# Patient Record
Sex: FEMALE | Race: WHITE | NOT HISPANIC OR LATINO | Employment: UNEMPLOYED | URBAN - METROPOLITAN AREA
[De-identification: names, ages, dates, MRNs, and addresses within clinical notes are randomized per-mention and may not be internally consistent; named-entity substitution may affect disease eponyms.]

---
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
## 2022-01-21 LAB — GROUP B STREPTOCOCCUS (EXTERNAL): NEGATIVE

## 2022-02-11 ENCOUNTER — DOCUMENTATION ONLY (OUTPATIENT)
Dept: OTHER | Facility: CLINIC | Age: 22
End: 2022-02-11
Payer: COMMERCIAL

## 2022-02-11 ENCOUNTER — ANESTHESIA EVENT (OUTPATIENT)
Dept: OBGYN | Facility: HOSPITAL | Age: 22
End: 2022-02-11
Payer: COMMERCIAL

## 2022-02-11 ENCOUNTER — HOSPITAL ENCOUNTER (INPATIENT)
Facility: HOSPITAL | Age: 22
LOS: 2 days | Discharge: HOME | End: 2022-02-13
Attending: ADVANCED PRACTICE MIDWIFE | Admitting: ADVANCED PRACTICE MIDWIFE
Payer: COMMERCIAL

## 2022-02-11 ENCOUNTER — ANESTHESIA (OUTPATIENT)
Dept: OBGYN | Facility: HOSPITAL | Age: 22
End: 2022-02-11
Payer: COMMERCIAL

## 2022-02-11 PROBLEM — Z37.9 NORMAL LABOR: Status: ACTIVE | Noted: 2022-02-11

## 2022-02-11 PROBLEM — Z36.89 ENCOUNTER FOR TRIAGE IN PREGNANT PATIENT: Status: ACTIVE | Noted: 2022-02-11

## 2022-02-11 LAB
ABO/RH(D): NORMAL
ANTIBODY SCREEN: NEGATIVE
BASOPHILS # BLD AUTO: 0.1 10E3/UL (ref 0–0.2)
BASOPHILS NFR BLD AUTO: 0 %
EOSINOPHIL # BLD AUTO: 0.1 10E3/UL (ref 0–0.7)
EOSINOPHIL NFR BLD AUTO: 0 %
ERYTHROCYTE [DISTWIDTH] IN BLOOD BY AUTOMATED COUNT: 16.1 % (ref 10–15)
HCT VFR BLD AUTO: 35.5 % (ref 35–47)
HGB BLD-MCNC: 12.1 G/DL (ref 11.7–15.7)
IMM GRANULOCYTES # BLD: 0.2 10E3/UL
IMM GRANULOCYTES NFR BLD: 1 %
LYMPHOCYTES # BLD AUTO: 1.5 10E3/UL (ref 0.8–5.3)
LYMPHOCYTES NFR BLD AUTO: 6 %
MCH RBC QN AUTO: 29.3 PG (ref 26.5–33)
MCHC RBC AUTO-ENTMCNC: 34.1 G/DL (ref 31.5–36.5)
MCV RBC AUTO: 86 FL (ref 78–100)
MONOCYTES # BLD AUTO: 0.9 10E3/UL (ref 0–1.3)
MONOCYTES NFR BLD AUTO: 4 %
NEUTROPHILS # BLD AUTO: 21.8 10E3/UL (ref 1.6–8.3)
NEUTROPHILS NFR BLD AUTO: 89 %
NRBC # BLD AUTO: 0 10E3/UL
NRBC BLD AUTO-RTO: 0 /100
PLATELET # BLD AUTO: 238 10E3/UL (ref 150–450)
RBC # BLD AUTO: 4.13 10E6/UL (ref 3.8–5.2)
RUPTURE OF FETAL MEMBRANES BY ROM PLUS: POSITIVE
SARS-COV-2 RNA RESP QL NAA+PROBE: NEGATIVE
SPECIMEN EXPIRATION DATE: NORMAL
WBC # BLD AUTO: 24.3 10E3/UL (ref 4–11)

## 2022-02-11 PROCEDURE — 86780 TREPONEMA PALLIDUM: CPT | Performed by: ADVANCED PRACTICE MIDWIFE

## 2022-02-11 PROCEDURE — 3E0R3BZ INTRODUCTION OF ANESTHETIC AGENT INTO SPINAL CANAL, PERCUTANEOUS APPROACH: ICD-10-PCS | Performed by: ANESTHESIOLOGY

## 2022-02-11 PROCEDURE — 370N000003 HC ANESTHESIA WARD SERVICE

## 2022-02-11 PROCEDURE — 250N000009 HC RX 250: Performed by: ADVANCED PRACTICE MIDWIFE

## 2022-02-11 PROCEDURE — 0KQM0ZZ REPAIR PERINEUM MUSCLE, OPEN APPROACH: ICD-10-PCS | Performed by: ADVANCED PRACTICE MIDWIFE

## 2022-02-11 PROCEDURE — 250N000011 HC RX IP 250 OP 636: Performed by: ADVANCED PRACTICE MIDWIFE

## 2022-02-11 PROCEDURE — 85025 COMPLETE CBC W/AUTO DIFF WBC: CPT | Performed by: ADVANCED PRACTICE MIDWIFE

## 2022-02-11 PROCEDURE — 258N000003 HC RX IP 258 OP 636: Performed by: ADVANCED PRACTICE MIDWIFE

## 2022-02-11 PROCEDURE — 120N000001 HC R&B MED SURG/OB

## 2022-02-11 PROCEDURE — 86901 BLOOD TYPING SEROLOGIC RH(D): CPT | Performed by: ADVANCED PRACTICE MIDWIFE

## 2022-02-11 PROCEDURE — 84112 EVAL AMNIOTIC FLUID PROTEIN: CPT | Performed by: ADVANCED PRACTICE MIDWIFE

## 2022-02-11 PROCEDURE — 250N000013 HC RX MED GY IP 250 OP 250 PS 637: Performed by: ADVANCED PRACTICE MIDWIFE

## 2022-02-11 PROCEDURE — 722N000001 HC LABOR CARE VAGINAL DELIVERY SINGLE

## 2022-02-11 PROCEDURE — 258N000003 HC RX IP 258 OP 636: Performed by: ANESTHESIOLOGY

## 2022-02-11 PROCEDURE — 250N000011 HC RX IP 250 OP 636: Performed by: ANESTHESIOLOGY

## 2022-02-11 PROCEDURE — 00HU33Z INSERTION OF INFUSION DEVICE INTO SPINAL CANAL, PERCUTANEOUS APPROACH: ICD-10-PCS | Performed by: ANESTHESIOLOGY

## 2022-02-11 PROCEDURE — 87635 SARS-COV-2 COVID-19 AMP PRB: CPT | Performed by: ADVANCED PRACTICE MIDWIFE

## 2022-02-11 PROCEDURE — 36415 COLL VENOUS BLD VENIPUNCTURE: CPT | Performed by: ADVANCED PRACTICE MIDWIFE

## 2022-02-11 PROCEDURE — 250N000009 HC RX 250: Performed by: ANESTHESIOLOGY

## 2022-02-11 RX ORDER — FENTANYL CITRATE-0.9 % NACL/PF 10 MCG/ML
100 PLASTIC BAG, INJECTION (ML) INTRAVENOUS EVERY 5 MIN PRN
Status: DISCONTINUED | OUTPATIENT
Start: 2022-02-11 | End: 2022-02-11 | Stop reason: HOSPADM

## 2022-02-11 RX ORDER — METHYLERGONOVINE MALEATE 0.2 MG/ML
200 INJECTION INTRAVENOUS
Status: DISCONTINUED | OUTPATIENT
Start: 2022-02-11 | End: 2022-02-11 | Stop reason: HOSPADM

## 2022-02-11 RX ORDER — METHYLERGONOVINE MALEATE 0.2 MG/ML
200 INJECTION INTRAVENOUS
Status: DISCONTINUED | OUTPATIENT
Start: 2022-02-11 | End: 2022-02-13 | Stop reason: HOSPADM

## 2022-02-11 RX ORDER — MISOPROSTOL 200 UG/1
400 TABLET ORAL
Status: DISCONTINUED | OUTPATIENT
Start: 2022-02-11 | End: 2022-02-13 | Stop reason: HOSPADM

## 2022-02-11 RX ORDER — OXYTOCIN/0.9 % SODIUM CHLORIDE 30/500 ML
1-24 PLASTIC BAG, INJECTION (ML) INTRAVENOUS CONTINUOUS
Status: DISCONTINUED | OUTPATIENT
Start: 2022-02-11 | End: 2022-02-11 | Stop reason: HOSPADM

## 2022-02-11 RX ORDER — OXYTOCIN/0.9 % SODIUM CHLORIDE 30/500 ML
340 PLASTIC BAG, INJECTION (ML) INTRAVENOUS CONTINUOUS PRN
Status: DISCONTINUED | OUTPATIENT
Start: 2022-02-11 | End: 2022-02-11 | Stop reason: HOSPADM

## 2022-02-11 RX ORDER — OXYTOCIN 10 [USP'U]/ML
10 INJECTION, SOLUTION INTRAMUSCULAR; INTRAVENOUS
Status: DISCONTINUED | OUTPATIENT
Start: 2022-02-11 | End: 2022-02-13 | Stop reason: HOSPADM

## 2022-02-11 RX ORDER — FERROUS SULFATE 325(65) MG
325 TABLET ORAL
COMMUNITY
End: 2022-12-26

## 2022-02-11 RX ORDER — IBUPROFEN 600 MG/1
600 TABLET, FILM COATED ORAL
Status: DISCONTINUED | OUTPATIENT
Start: 2022-02-11 | End: 2022-02-13 | Stop reason: HOSPADM

## 2022-02-11 RX ORDER — IBUPROFEN 800 MG/1
800 TABLET, FILM COATED ORAL EVERY 6 HOURS PRN
Status: DISCONTINUED | OUTPATIENT
Start: 2022-02-11 | End: 2022-02-13 | Stop reason: HOSPADM

## 2022-02-11 RX ORDER — OXYTOCIN 10 [USP'U]/ML
10 INJECTION, SOLUTION INTRAMUSCULAR; INTRAVENOUS
Status: DISCONTINUED | OUTPATIENT
Start: 2022-02-11 | End: 2022-02-11 | Stop reason: HOSPADM

## 2022-02-11 RX ORDER — OXYCODONE HYDROCHLORIDE 5 MG/1
5 TABLET ORAL
Status: DISCONTINUED | OUTPATIENT
Start: 2022-02-11 | End: 2022-02-13 | Stop reason: HOSPADM

## 2022-02-11 RX ORDER — MISOPROSTOL 200 UG/1
800 TABLET ORAL
Status: DISCONTINUED | OUTPATIENT
Start: 2022-02-11 | End: 2022-02-11 | Stop reason: HOSPADM

## 2022-02-11 RX ORDER — NALOXONE HYDROCHLORIDE 0.4 MG/ML
0.4 INJECTION, SOLUTION INTRAMUSCULAR; INTRAVENOUS; SUBCUTANEOUS
Status: DISCONTINUED | OUTPATIENT
Start: 2022-02-11 | End: 2022-02-13 | Stop reason: HOSPADM

## 2022-02-11 RX ORDER — ONDANSETRON 4 MG/1
4 TABLET, ORALLY DISINTEGRATING ORAL EVERY 6 HOURS PRN
Status: DISCONTINUED | OUTPATIENT
Start: 2022-02-11 | End: 2022-02-11 | Stop reason: HOSPADM

## 2022-02-11 RX ORDER — OXYTOCIN/0.9 % SODIUM CHLORIDE 30/500 ML
100-340 PLASTIC BAG, INJECTION (ML) INTRAVENOUS CONTINUOUS PRN
Status: DISCONTINUED | OUTPATIENT
Start: 2022-02-11 | End: 2022-02-13 | Stop reason: HOSPADM

## 2022-02-11 RX ORDER — MISOPROSTOL 200 UG/1
800 TABLET ORAL
Status: DISCONTINUED | OUTPATIENT
Start: 2022-02-11 | End: 2022-02-13 | Stop reason: HOSPADM

## 2022-02-11 RX ORDER — CARBOPROST TROMETHAMINE 250 UG/ML
250 INJECTION, SOLUTION INTRAMUSCULAR
Status: DISCONTINUED | OUTPATIENT
Start: 2022-02-11 | End: 2022-02-13 | Stop reason: HOSPADM

## 2022-02-11 RX ORDER — METOCLOPRAMIDE HYDROCHLORIDE 5 MG/ML
10 INJECTION INTRAMUSCULAR; INTRAVENOUS EVERY 6 HOURS PRN
Status: DISCONTINUED | OUTPATIENT
Start: 2022-02-11 | End: 2022-02-11 | Stop reason: HOSPADM

## 2022-02-11 RX ORDER — LIDOCAINE 40 MG/G
CREAM TOPICAL
Status: DISCONTINUED | OUTPATIENT
Start: 2022-02-11 | End: 2022-02-11 | Stop reason: HOSPADM

## 2022-02-11 RX ORDER — KETOROLAC TROMETHAMINE 30 MG/ML
30 INJECTION, SOLUTION INTRAMUSCULAR; INTRAVENOUS
Status: DISCONTINUED | OUTPATIENT
Start: 2022-02-11 | End: 2022-02-13 | Stop reason: HOSPADM

## 2022-02-11 RX ORDER — PRENATAL VIT/IRON FUM/FOLIC AC 27MG-0.8MG
1 TABLET ORAL DAILY
COMMUNITY
End: 2022-12-26

## 2022-02-11 RX ORDER — NALOXONE HYDROCHLORIDE 0.4 MG/ML
0.2 INJECTION, SOLUTION INTRAMUSCULAR; INTRAVENOUS; SUBCUTANEOUS
Status: DISCONTINUED | OUTPATIENT
Start: 2022-02-11 | End: 2022-02-11 | Stop reason: HOSPADM

## 2022-02-11 RX ORDER — FENTANYL CITRATE 50 UG/ML
50-100 INJECTION, SOLUTION INTRAMUSCULAR; INTRAVENOUS
Status: DISCONTINUED | OUTPATIENT
Start: 2022-02-11 | End: 2022-02-11 | Stop reason: HOSPADM

## 2022-02-11 RX ORDER — ACETAMINOPHEN 325 MG/1
650 TABLET ORAL EVERY 4 HOURS PRN
Status: DISCONTINUED | OUTPATIENT
Start: 2022-02-11 | End: 2022-02-11 | Stop reason: HOSPADM

## 2022-02-11 RX ORDER — PROCHLORPERAZINE MALEATE 10 MG
10 TABLET ORAL EVERY 6 HOURS PRN
Status: DISCONTINUED | OUTPATIENT
Start: 2022-02-11 | End: 2022-02-11 | Stop reason: HOSPADM

## 2022-02-11 RX ORDER — OXYTOCIN/0.9 % SODIUM CHLORIDE 30/500 ML
340 PLASTIC BAG, INJECTION (ML) INTRAVENOUS CONTINUOUS PRN
Status: DISCONTINUED | OUTPATIENT
Start: 2022-02-11 | End: 2022-02-13 | Stop reason: HOSPADM

## 2022-02-11 RX ORDER — ACETAMINOPHEN 325 MG/1
650 TABLET ORAL EVERY 4 HOURS PRN
Status: DISCONTINUED | OUTPATIENT
Start: 2022-02-11 | End: 2022-02-13 | Stop reason: HOSPADM

## 2022-02-11 RX ORDER — ONDANSETRON 2 MG/ML
4 INJECTION INTRAMUSCULAR; INTRAVENOUS EVERY 6 HOURS PRN
Status: DISCONTINUED | OUTPATIENT
Start: 2022-02-11 | End: 2022-02-11 | Stop reason: HOSPADM

## 2022-02-11 RX ORDER — NALBUPHINE HYDROCHLORIDE 10 MG/ML
2.5-5 INJECTION, SOLUTION INTRAMUSCULAR; INTRAVENOUS; SUBCUTANEOUS EVERY 6 HOURS PRN
Status: DISCONTINUED | OUTPATIENT
Start: 2022-02-11 | End: 2022-02-13 | Stop reason: HOSPADM

## 2022-02-11 RX ORDER — HYDROXYZINE HYDROCHLORIDE 50 MG/1
50 TABLET, FILM COATED ORAL ONCE
Status: COMPLETED | OUTPATIENT
Start: 2022-02-11 | End: 2022-02-11

## 2022-02-11 RX ORDER — SODIUM CHLORIDE, SODIUM LACTATE, POTASSIUM CHLORIDE, CALCIUM CHLORIDE 600; 310; 30; 20 MG/100ML; MG/100ML; MG/100ML; MG/100ML
INJECTION, SOLUTION INTRAVENOUS CONTINUOUS
Status: DISCONTINUED | OUTPATIENT
Start: 2022-02-11 | End: 2022-02-11 | Stop reason: HOSPADM

## 2022-02-11 RX ORDER — NALOXONE HYDROCHLORIDE 0.4 MG/ML
0.4 INJECTION, SOLUTION INTRAMUSCULAR; INTRAVENOUS; SUBCUTANEOUS
Status: DISCONTINUED | OUTPATIENT
Start: 2022-02-11 | End: 2022-02-11 | Stop reason: HOSPADM

## 2022-02-11 RX ORDER — PROCHLORPERAZINE 25 MG
25 SUPPOSITORY, RECTAL RECTAL EVERY 12 HOURS PRN
Status: DISCONTINUED | OUTPATIENT
Start: 2022-02-11 | End: 2022-02-11 | Stop reason: HOSPADM

## 2022-02-11 RX ORDER — MISOPROSTOL 200 UG/1
400 TABLET ORAL
Status: DISCONTINUED | OUTPATIENT
Start: 2022-02-11 | End: 2022-02-11 | Stop reason: HOSPADM

## 2022-02-11 RX ORDER — SODIUM CHLORIDE, SODIUM LACTATE, POTASSIUM CHLORIDE, CALCIUM CHLORIDE 600; 310; 30; 20 MG/100ML; MG/100ML; MG/100ML; MG/100ML
INJECTION, SOLUTION INTRAVENOUS CONTINUOUS PRN
Status: DISCONTINUED | OUTPATIENT
Start: 2022-02-11 | End: 2022-02-11 | Stop reason: HOSPADM

## 2022-02-11 RX ORDER — NALOXONE HYDROCHLORIDE 0.4 MG/ML
0.2 INJECTION, SOLUTION INTRAMUSCULAR; INTRAVENOUS; SUBCUTANEOUS
Status: DISCONTINUED | OUTPATIENT
Start: 2022-02-11 | End: 2022-02-13 | Stop reason: HOSPADM

## 2022-02-11 RX ORDER — MODIFIED LANOLIN
OINTMENT (GRAM) TOPICAL
Status: DISCONTINUED | OUTPATIENT
Start: 2022-02-11 | End: 2022-02-13 | Stop reason: HOSPADM

## 2022-02-11 RX ORDER — HYDROCORTISONE 2.5 %
CREAM (GRAM) TOPICAL 3 TIMES DAILY PRN
Status: DISCONTINUED | OUTPATIENT
Start: 2022-02-11 | End: 2022-02-13 | Stop reason: HOSPADM

## 2022-02-11 RX ORDER — CARBOPROST TROMETHAMINE 250 UG/ML
250 INJECTION, SOLUTION INTRAMUSCULAR
Status: DISCONTINUED | OUTPATIENT
Start: 2022-02-11 | End: 2022-02-11 | Stop reason: HOSPADM

## 2022-02-11 RX ORDER — MORPHINE SULFATE 10 MG/ML
10 INJECTION, SOLUTION INTRAMUSCULAR; INTRAVENOUS ONCE
Status: COMPLETED | OUTPATIENT
Start: 2022-02-11 | End: 2022-02-11

## 2022-02-11 RX ORDER — OXYCODONE HYDROCHLORIDE 5 MG/1
5 TABLET ORAL EVERY 4 HOURS PRN
Status: DISCONTINUED | OUTPATIENT
Start: 2022-02-11 | End: 2022-02-13 | Stop reason: HOSPADM

## 2022-02-11 RX ORDER — DOCUSATE SODIUM 100 MG/1
100 CAPSULE, LIQUID FILLED ORAL DAILY
Status: DISCONTINUED | OUTPATIENT
Start: 2022-02-12 | End: 2022-02-13 | Stop reason: HOSPADM

## 2022-02-11 RX ORDER — METOCLOPRAMIDE 10 MG/1
10 TABLET ORAL EVERY 6 HOURS PRN
Status: DISCONTINUED | OUTPATIENT
Start: 2022-02-11 | End: 2022-02-11 | Stop reason: HOSPADM

## 2022-02-11 RX ORDER — BISACODYL 10 MG
10 SUPPOSITORY, RECTAL RECTAL DAILY PRN
Status: DISCONTINUED | OUTPATIENT
Start: 2022-02-11 | End: 2022-02-13 | Stop reason: HOSPADM

## 2022-02-11 RX ADMIN — Medication 2 MILLI-UNITS/MIN: at 15:47

## 2022-02-11 RX ADMIN — HYDROXYZINE HYDROCHLORIDE 50 MG: 50 TABLET, FILM COATED ORAL at 16:31

## 2022-02-11 RX ADMIN — KETOROLAC TROMETHAMINE 30 MG: 30 INJECTION, SOLUTION INTRAMUSCULAR at 22:33

## 2022-02-11 RX ADMIN — SODIUM CHLORIDE, POTASSIUM CHLORIDE, SODIUM LACTATE AND CALCIUM CHLORIDE 500 ML: 600; 310; 30; 20 INJECTION, SOLUTION INTRAVENOUS at 14:43

## 2022-02-11 RX ADMIN — MORPHINE SULFATE 10 MG: 10 INJECTION INTRAVENOUS at 16:31

## 2022-02-11 RX ADMIN — LIDOCAINE HYDROCHLORIDE 20 ML: 10 INJECTION, SOLUTION EPIDURAL; INFILTRATION; INTRACAUDAL; PERINEURAL at 21:43

## 2022-02-11 RX ADMIN — FENTANYL CITRATE: 50 INJECTION, SOLUTION INTRAMUSCULAR; INTRAVENOUS at 18:29

## 2022-02-11 RX ADMIN — MISOPROSTOL 800 MCG: 200 TABLET ORAL at 21:44

## 2022-02-11 RX ADMIN — SODIUM CHLORIDE, POTASSIUM CHLORIDE, SODIUM LACTATE AND CALCIUM CHLORIDE: 600; 310; 30; 20 INJECTION, SOLUTION INTRAVENOUS at 18:44

## 2022-02-11 ASSESSMENT — ACTIVITIES OF DAILY LIVING (ADL)
DRESSING/BATHING_DIFFICULTY: NO
TOILETING_ISSUES: NO
HEARING_DIFFICULTY_OR_DEAF: NO
FALL_HISTORY_WITHIN_LAST_SIX_MONTHS: NO
WEAR_GLASSES_OR_BLIND: NO
CONCENTRATING,_REMEMBERING_OR_MAKING_DECISIONS_DIFFICULTY: NO
DOING_ERRANDS_INDEPENDENTLY_DIFFICULTY: NO
DIFFICULTY_COMMUNICATING: NO
DIFFICULTY_EATING/SWALLOWING: NO
WALKING_OR_CLIMBING_STAIRS_DIFFICULTY: NO

## 2022-02-11 ASSESSMENT — MIFFLIN-ST. JEOR: SCORE: 1625.91

## 2022-02-11 NOTE — PROGRESS NOTES
Maria A Johnson CNM in department. Reviewed EFM tracing, category II with minimal variability and variable deceleration, infrequent mild uterine activity, maternal pulse >100 in the absence of fever or chills. Orders received for IV bolus.

## 2022-02-11 NOTE — PROGRESS NOTES
Discussion via phone with Maria A Johnson CNM and updated regarding uterine activity and Category I EFM tracing. Discussed Blessing's complaints of vaginal discharge since membranes were stripped in clinic on Tuesday and suspicion for ROM. Orders received for ROM+ then to check cervix and call results of both to Maria A. Provider does not want speculum exam done at this time.

## 2022-02-11 NOTE — PROGRESS NOTES
Maria A Johnson CNM provided phone update regarding ROM+ positive results with Blessing being able to narrow down SROM to 2/9/2022 at 0200, SVE/Armstrong score, irregular uterine activity that palpates mild to moderate. Informed of category II EFM tracing after last phone call noting minimal variability and a few late decelerations while patient had changed to a low-fowlers position that resolved and became a category I EFM tracing following repositioning to right side. Will enter orders remotely. Reviewed risk factors including mental health history, anemia and prolonged ROM.

## 2022-02-11 NOTE — PROGRESS NOTES
Blessing lying in low semi-fowlers upon entering room after phone update to [facility]. Normal FHR noted with minimal variability and late decelerations after last few contractions. ROM+ obtained and Blessing repositioned to right side, monitors adjusted and discussed plan of care for continued fetal surveillance, timing of ROM+ to result and plan to repeat SVE once ROM+ results and update provider.

## 2022-02-11 NOTE — PROGRESS NOTES
Data: [facility] mother, Denise.   Reason for maternal/fetal assessment per patient is Contractions (Tues membranes stripped. cramping on and off. 2/10 started timing contractions 0. Stopped and returned at 0300. )  .  Patient is a . Prenatal record reviewed.      OB History    Para Term  AB Living   1 0 0 0 0 0   SAB IAB Ectopic Multiple Live Births   0 0 0 0 0      # Outcome Date GA Lbr Ad/2nd Weight Sex Delivery Anes PTL Lv   1 Current            . Medical history:   Past Medical History:   Diagnosis Date     Depressive disorder     Tried taking medications , unsure of date     NO ACTIVE PROBLEMS        . Gestational Age 40w1d. VSS.   B/P: 129/80, T: 98.3, P: 104, R: 18    Fetal movement present. -140 baseline with moderate variability and accelerations, no decelerations.    Patient does complain of some vaginal discharge that has increased since membrane sweep on , doesn't think her water broke. Cramping started after the membrane sweep and continued on and off, she also states she lost her mucous plug. Last night 2/10, around 2330, she started timing the contractions. The contractions spaced out and she was able to get some rest. Until 0, the contractions returned and she wasn't able to fall back asleep. She took 500mg of PO Tylenol and soon after, woke her mom to bring her in for labor evaluation.        Patient denies  backache, pelvic pressure, UTI symptoms, GI problems, bloody show, vaginal bleeding, edema, headache, visual disturbances, epigastric or URQ pain, abdominal pain, rupture of membranes. Support persons, Denise present.      Action: Verbal consent for EFM. Triage assessment completed. EFM applied. Uterine assessment 2.5//-1 and Vertex. Fetal assessment: Presumed adequate fetal oxygenation documented (see flow record).       Response: Maral Bennett informed at 0550 of patient's arrival, EFM baseline, ctx pattern, and SVE.  Plan per provider is remove EFM/toco if cat 1 tracing, allow patient to move around the room with the birthing ball, recheck SVE at 0800, call Maria A Johnson with assessment. Patient verbalized agreement with plan. Patient transferred to room 31 ambulatory, oriented to room and call light. Report given to CLARENCE Rodney, ALEJANDRAN, RN

## 2022-02-11 NOTE — PROGRESS NOTES
S: Blessing is ambulating in her room. States the contractions have decreased now.     O: /77   Pulse 104   Temp 97.9  F (36.6  C)   Resp 18   Ht 1.524 m (5')   Wt 93.4 kg (206 lb)   SpO2 96%   Breastfeeding Yes   BMI 40.23 kg/m    EFM: 135 baseline, moderate variability, +accelerations, no decelerations; was minimal prior, but improved after fluid bolus  Pilot [place]: q3-6 minutes, lasting 60-90 seconds, palpate mild, soft uterine resting tone ntoed  SVE: deferred at this time    A:  at 40w1d 60hrs s/p SROM in early latent labor    P: Reviewed with Blessing and her mother that her contractions do appear to have spaced out and are no longer the strength that they were this morning. Reviewed R/B/A of Pitocin augmentation and they are in agreement. Will begin Pitocin and continue to utilize position changes to assist with fetal rotation and descent.    Dr. Schwarz remains available for consultation and collaboration as needed.     ESTEFANI VillatoroM

## 2022-02-11 NOTE — PROGRESS NOTES
"Care assumed and introduced to Blessing and her mother, Denise. Maral reports contractions yesterday increasing in intensity around 2300 that has continued to increase in frequency and intensity. Coached with breathing through contractions and relaxation. Decreased environmental stimuli and encouraged fluids as able. Reviewed prenatal record. Monitors applied and vital signs obtained. Brisk reflex in RLE with no clonus, otherwise WDL. 1+edema in lower extremities. Blessing denies headache, visual changes or epigastric pain. Admits to \"increased vaginal discharge\" since Tuesday after SVE in clinic and states she has been wearing a pad since then. She also reports \"a couple of times\" where she has felt \"small gushes\". She denies fever, chills, abdominal tenderness or any odor to the discharge and states it is clear but sometimes has a yellow or pink tint. Placed in pooling position when monitors applied. Varying intensity of contractions by this writer that palpate mild to moderate. Discussed plan of care for further evaluation and SVE.   "

## 2022-02-11 NOTE — PLAN OF CARE
Problem: Infection (Labor)  Goal: Absence of Infection Signs and Symptoms  Outcome: No Change  Intervention: Prevent or Manage Infection  Recent Flowsheet Documentation  Taken 2/11/2022 1443 by Mildred Richardson RN  Infection Management: aseptic technique maintained  Taken 2/11/2022 0752 by Mildred Richardson, RN  Infection Prevention:   environmental surveillance performed   equipment surfaces disinfected   hand hygiene promoted   personal protective equipment utilized   rest/sleep promoted   single patient room provided   visitors restricted/screened   Blessing remains afebrile and denies fever, chills, body aches or uterine tenderness. Leaking clear to pink-tinged fluid with no odor. Pulse >100, CNM notified.    Problem: Delayed Labor Progression (Labor)  Goal: Effective Progression to Delivery  Outcome: No Change   Contractions remain irregular in frequency and intensity. CNM informed and considering Pitocin augmentation. SVE deferred at this time after discussion with CNM secondary to prolonged ROM.

## 2022-02-11 NOTE — H&P
HISTORY AND PHYSICAL UPDATE ADMISSION EXAM    Name: Cary Frey  YOB: 2000  Medical Record Number: [id number]    History of Present Illness: Cary Frey is a 22 year old female who is 40w1d pregnant and being admitted for SROM.    Estimated Date of Delivery: Feb 10, 2022    EGA 40w1d    OB History    Para Term  AB Living   1 0 0 0 0 0   SAB IAB Ectopic Multiple Live Births   0 0 0 0 0      # Outcome Date GA Lbr Ad/2nd Weight Sex Delivery Anes PTL Lv   1 Current                 Lab Results   Component Value Date    AS Negative 2022    GCPCRT Negative 10/15/2020    HGB 12.1 2022       Prenatal Complications: Prepregnancy BMI=30  PMH: Hx anxiety/depression, hospitalization for suicidal ideation in 2016  PSH: None  Genetic: Carrier for polycystic kidney disease-FOB not a carrier     Exam:      /77   Pulse 104   Temp 97.9  F (36.6  C)   Resp 18   Ht 1.524 m (5')   Wt 93.4 kg (206 lb)   SpO2 96%   Breastfeeding Yes   BMI 40.23 kg/m      Fetal heart Rate Category 1 on admit per RN  Contractions q3-4 minutes on admit per RN    HEENT grossly normal  Neck: no lymphadenopathy or thryoidomegaly  Lungs clear; no respiratory distress  Heart RRR  ABD gravid, non-tender  EXT:  Trace edema, moves freely  Vaginal exam 470/-1 change from 2.5/70/-1 in triage  Membranes: PROM    Assessment: active labor management    Plan: Admit - see IP orders  Pain medication PRN  Dr. Schwarz available for consultation and collaboration as needed throughout labor and delivery.  Anticipate     Prenatal record reviewed.    ESTEFANI Villatoro CNM      2022   3:49 PM

## 2022-02-12 ENCOUNTER — MEDICAL CORRESPONDENCE (OUTPATIENT)
Dept: HEALTH INFORMATION MANAGEMENT | Facility: CLINIC | Age: 22
End: 2022-02-12

## 2022-02-12 PROBLEM — R00.0 TACHYCARDIA: Status: ACTIVE | Noted: 2022-02-12

## 2022-02-12 LAB
BASOPHILS # BLD AUTO: 0 10E3/UL (ref 0–0.2)
BASOPHILS NFR BLD AUTO: 0 %
EOSINOPHIL # BLD AUTO: 0 10E3/UL (ref 0–0.7)
EOSINOPHIL NFR BLD AUTO: 0 %
ERYTHROCYTE [DISTWIDTH] IN BLOOD BY AUTOMATED COUNT: 16.4 % (ref 10–15)
HCT VFR BLD AUTO: 29.6 % (ref 35–47)
HGB BLD-MCNC: 9.9 G/DL (ref 11.7–15.7)
IMM GRANULOCYTES # BLD: 0.2 10E3/UL
IMM GRANULOCYTES NFR BLD: 1 %
LYMPHOCYTES # BLD AUTO: 2.3 10E3/UL (ref 0.8–5.3)
LYMPHOCYTES NFR BLD AUTO: 10 %
MCH RBC QN AUTO: 29.4 PG (ref 26.5–33)
MCHC RBC AUTO-ENTMCNC: 33.4 G/DL (ref 31.5–36.5)
MCV RBC AUTO: 88 FL (ref 78–100)
MONOCYTES # BLD AUTO: 1.8 10E3/UL (ref 0–1.3)
MONOCYTES NFR BLD AUTO: 7 %
NEUTROPHILS # BLD AUTO: 19.9 10E3/UL (ref 1.6–8.3)
NEUTROPHILS NFR BLD AUTO: 82 %
NRBC # BLD AUTO: 0 10E3/UL
NRBC BLD AUTO-RTO: 0 /100
PLATELET # BLD AUTO: 228 10E3/UL (ref 150–450)
RBC # BLD AUTO: 3.37 10E6/UL (ref 3.8–5.2)
T PALLIDUM AB SER QL: NONREACTIVE
WBC # BLD AUTO: 24 10E3/UL (ref 4–11)

## 2022-02-12 PROCEDURE — 250N000013 HC RX MED GY IP 250 OP 250 PS 637: Performed by: ADVANCED PRACTICE MIDWIFE

## 2022-02-12 PROCEDURE — 120N000001 HC R&B MED SURG/OB

## 2022-02-12 PROCEDURE — 85025 COMPLETE CBC W/AUTO DIFF WBC: CPT | Performed by: ADVANCED PRACTICE MIDWIFE

## 2022-02-12 PROCEDURE — 99232 SBSQ HOSP IP/OBS MODERATE 35: CPT | Performed by: HOSPITALIST

## 2022-02-12 PROCEDURE — 258N000003 HC RX IP 258 OP 636: Performed by: ADVANCED PRACTICE MIDWIFE

## 2022-02-12 PROCEDURE — 36415 COLL VENOUS BLD VENIPUNCTURE: CPT | Performed by: ADVANCED PRACTICE MIDWIFE

## 2022-02-12 PROCEDURE — 99207 PR CONSULT E&M CHANGED TO SUBSEQUENT LEVEL: CPT | Performed by: HOSPITALIST

## 2022-02-12 RX ORDER — FERROUS SULFATE 325(65) MG
325 TABLET ORAL DAILY
Status: DISCONTINUED | OUTPATIENT
Start: 2022-02-12 | End: 2022-02-13 | Stop reason: HOSPADM

## 2022-02-12 RX ADMIN — IBUPROFEN 800 MG: 800 TABLET, FILM COATED ORAL at 09:10

## 2022-02-12 RX ADMIN — Medication: at 05:26

## 2022-02-12 RX ADMIN — FERROUS SULFATE TAB 325 MG (65 MG ELEMENTAL FE) 325 MG: 325 (65 FE) TAB at 10:14

## 2022-02-12 RX ADMIN — DOCUSATE SODIUM 100 MG: 100 CAPSULE, LIQUID FILLED ORAL at 09:10

## 2022-02-12 RX ADMIN — ACETAMINOPHEN 650 MG: 325 TABLET ORAL at 21:20

## 2022-02-12 RX ADMIN — SODIUM CHLORIDE, POTASSIUM CHLORIDE, SODIUM LACTATE AND CALCIUM CHLORIDE 500 ML: 600; 310; 30; 20 INJECTION, SOLUTION INTRAVENOUS at 06:54

## 2022-02-12 RX ADMIN — ACETAMINOPHEN 650 MG: 325 TABLET ORAL at 05:26

## 2022-02-12 RX ADMIN — IBUPROFEN 800 MG: 800 TABLET, FILM COATED ORAL at 23:01

## 2022-02-12 RX ADMIN — Medication: at 10:14

## 2022-02-12 RX ADMIN — IBUPROFEN 800 MG: 800 TABLET, FILM COATED ORAL at 16:24

## 2022-02-12 NOTE — PLAN OF CARE
Problem: Bleeding (Postpartum Vaginal Delivery)  Goal: Hemostasis  Outcome: Improving   Lochia rubra and light without clots. Denied dizziness/lightheadedness. Hgb check this AM.     Problem: Infection (Postpartum Vaginal Delivery)  Goal: Absence of Infection Signs and Symptoms  Outcome: Improving   Temp 99.1, HR remains tachy in 120's, notified CNM. Ordered CBC for this AM to monitor trending of WBC. VS otherwise WNL. Will continue monitoring.     Problem: Pain (Postpartum Vaginal Delivery)  Goal: Acceptable Pain Control  Outcome: Improving   Reported 3/10 uterine cramping and requested PRN Tylenol which was effective. Also given witch hazel pads and Dermaplast spray. Utilizing maurice bottle. Offered ice packs.

## 2022-02-12 NOTE — CONSULTS
[facility]  Consult Note - Hospitalist Service  Date of Admission:  2/11/2022  Consult Requested by: ESTEFANI Villatoro CNM  Reason for Consult: Tachycardia, leukocytosis, no other signs of infection    Assessment & Plan   Cary Frey is a 22 year old female admitted on 2/11/2022. She had PROM since 2 AM and was admitted for delivery.    1.  SIRS  Afebrile without signs or symptoms of infection  No previous cardiac history  Hold off on antibiotics unless becomes febrile  Monitor vital signs and trend WBC       The patient's care was discussed with the Patient and OB Team.    Ramon Kim MD  [facility]  Securely message with the Vocera Web Console (learn more here)  Text page via [facility] Paging/Directory       Hospitalist Service    Clinically Significant Risk Factors Present on Admission                 # Severe Obesity: Estimated body mass index is 40.23 kg/m  as calculated from the following:    Height as of this encounter: 1.524 m (5').    Weight as of this encounter: 93.4 kg (206 lb).      ______________________________________________________________________    Chief Complaint   Tachycardia    History is obtained from the patient and electronic health record    History of Present Illness   Cary Frey is a 22 year old female who was admitted for delivery due to PROM at 40 weeks 1 day.  Hospital medicine service consulted for evaluation of post partum tachycardia.  Heart rate 100-106 since admission and then 114-125 after delivery.  Patient is awake, alert, denies chest pain, shortness of breath or palpitations.  She denies abdominal pain, fevers or chills.  She also denies previous cardiac history.    Review of Systems   The 10 point Review of Systems is negative other than noted in the HPI or here.     Past Medical History    I have reviewed this patient's medical history and updated it with pertinent information if needed.   Past  Medical History:   Diagnosis Date     Depressive disorder     Tried taking medications 2018, unsure of date     NO ACTIVE PROBLEMS        Past Surgical History   I have reviewed this patient's surgical history and updated it with pertinent information if needed.  Past Surgical History:   Procedure Laterality Date     ADENOIDECTOMY       PE TUBES       WISDOM TOOTH EXTRACTION  2019       Social History   I have reviewed this patient's social history and updated it with pertinent information if needed.  Social History     Tobacco Use     Smoking status: Never Smoker     Smokeless tobacco: Former User   Substance Use Topics     Alcohol use: Not Currently     Drug use: Not Currently       Family History   I have reviewed this patient's family history and updated it with pertinent information if needed.  Family History   Problem Relation Age of Onset     Strabismus Sister      Eye Surgery Sister        Medications   I have reviewed this patient's current medications    Allergies   No Known Allergies    Physical Exam   Vital Signs: Temp: 98.3  F (36.8  C) Temp src: Oral BP: 121/61 Pulse: 106   Resp: 22 SpO2: 99 %      Weight: 206 lbs 0 oz    Constitutional: awake, alert, cooperative, no apparent distress, and appears stated age  Respiratory: no increased work of breathing, good air exchange and clear to auscultation  Cardiovascular: tachycardic with regular rhythm and normal S1 and S2, no murmurs  GI: normal bowel sounds, soft and non-tender  Neurologic: Mental Status Exam:  Level of Alertness:   awake  Orientation:   person, place, time  Motor Exam:  moves all extremities well and symmetrically    Data   Most Recent 3 CBC's:Recent Labs   Lab Test 02/11/22  0933   WBC 24.3*   HGB 12.1   MCV 86

## 2022-02-12 NOTE — ANESTHESIA PROCEDURE NOTES
Epidural catheter Procedure Note    Pre-Procedure   Staff -        Anesthesiologist:  Marysol Arndt MD       Performed By: anesthesiologist       Location: OB       Procedure Start/Stop Times: 2/11/2022 6:07 PM and 2/11/2022 6:23 PM       Pre-Anesthestic Checklist: patient identified, IV checked, risks and benefits discussed, informed consent, monitors and equipment checked, pre-op evaluation, at physician/surgeon's request and post-op pain management  Timeout:       Correct Patient: Yes        Correct Procedure: Yes        Correct Site: Yes        Correct Position: Yes   Procedure Documentation  Procedure: epidural catheter       Patient Position: sitting       Skin prep: Chloraprep       Local skin infiltrated with 3 mL of 1% lidocaine.        Insertion Site: L3-4. (midline approach).       Technique: LORT saline and LORT air        ALEE at 6 cm.       Needle type: Xcelaero.       Needle Gauge: 18.        Needle Length (Inches): 3.5        Catheter: 20 G.         Catheter threaded easily.         Threaded 10 cm at skin.        # of attempts: 1 and  # of redirects:     Assessment/Narrative         Paresthesias: No.      Test dose of 3 mL lidocaine 1.5% w/ 1:200,000 epinephrine at.         Test dose negative, 3 minutes after injection, for signs of intravascular, subdural, or intrathecal injection.       Insertion/Infusion Method: LORT saline and LORT air       Aspiration negative for Heme or CSF via Epidural Catheter.    Medication(s) Administered   0.125% Bupivacaine + 2 mcg/mL Fentanyl via CADD (Epidural), 7 mL  Medication Administration Time: 2/11/2022 6:23 PM

## 2022-02-12 NOTE — PROGRESS NOTES
Maria A Johnson CNM contacted about maternal pulse consistently above 120. At this time, Alex BOTELLO would like to continue to watch it. Afebrile. Pt feels well.    Andie Arzola RN on 2/11/2022 at 11:04 PM

## 2022-02-12 NOTE — PLAN OF CARE
Problem: Urinary Retention (Postpartum Vaginal Delivery)  Goal: Effective Urinary Elimination  Outcome: Improving     Problem: Bleeding (Postpartum Vaginal Delivery)  Goal: Hemostasis  Outcome: Improving   Pt's vs, perineum & lochia are wnl, voiding w/o diffculity, all linens changed, pt denies any questions or concerns at this time, will continue with current plan of care.

## 2022-02-12 NOTE — PLAN OF CARE
Problem: Bleeding (Postpartum Vaginal Delivery)  Goal: Hemostasis  Outcome: Improving  Fundus firm; bleeding is light; vitals stable; Hgb = 9.9 today.     Problem: Adult Inpatient Plan of Care  Goal: Optimal Comfort and Wellbeing  Intervention: Provide Person-Centered Care  Trust Relationship/Rapport:   care explained   choices provided   emotional support provided   empathic listening provided   questions answered   questions encouraged   reassurance provided   thoughts/feelings acknowledged     Problem: Pain (Postpartum Vaginal Delivery)  Goal: Acceptable Pain Control  Outcome: Improving  Intervention: Prevent or Manage Pain  Pain Management Interventions:   medication (see MAR)   cold applied  Complementary Therapy: (lavender bath salt)

## 2022-02-12 NOTE — PROGRESS NOTES
Vaginal Delivery Postpartum Day 1    Patient Name:  Cary Frey  :      2000  MRN:      [id number]      Assessment:  Normal postpartum course. Tachycardia.    Plan:  Continue current care. Hospitalist saw patient, see notes, will continue to monitor for s/sx of infection.    Subjective:  The patient feels well:  Voiding without difficulty, lochia normal, tolerating normal diet, and passing flatus.  Pain is well controlled with current medications.  The patient has no emotional concerns.  The baby is well and being fed by breast.    Objective:  /76 (BP Location: Right arm)   Pulse 112   Temp 98.2  F (36.8  C) (Oral)   Resp 18   Ht 1.524 m (5')   Wt 93.4 kg (206 lb)   SpO2 96%   Breastfeeding Yes   BMI 40.23 kg/m    Patient Vitals for the past 24 hrs:   BP Temp Temp src Pulse Resp SpO2   22 0817 116/76 98.2  F (36.8  C) Oral 112 18 96 %   22 0519 132/76 99.1  F (37.3  C) Oral 120 18 --   22 0100 112/65 98.2  F (36.8  C) Oral 105 18 99 %   22 2340 -- -- -- -- -- 99 %   22 2339 121/61 -- -- -- -- --   225 132/71 -- -- -- -- 98 %   22 -- -- -- -- -- 98 %   22 -- -- -- -- -- 98 %   22 133/81 -- -- -- -- --   22 -- -- -- -- -- 98 %   22 131/79 -- -- -- -- 99 %   22 -- -- -- -- -- 99 %   22 -- -- -- -- -- 99 %   22 120/73 98.3  F (36.8  C) Oral -- -- 94 %   22 137/54 -- -- -- -- 96 %   22 -- -- -- -- -- 96 %   22 122/59 -- -- -- -- 99 %   22 -- -- -- -- -- 98 %   22 -- -- -- -- -- 98 %   22 -- -- -- -- -- 99 %   22 -- -- -- -- -- 90 %   22 121/63 -- -- -- -- 96 %   22 121/63 -- -- -- -- 100 %   22 -- -- -- -- -- 100 %   22 120/69 -- -- -- -- 100 %   22 114/68 98.6  F (37  C) Oral -- -- 100 %   22 116/73 -- -- -- -- 100 %    22 1945 108/63 -- -- -- -- 99 %   22 1930 101/59 -- -- -- -- 98 %   22 1915 103/56 -- -- -- -- 98 %   22 1900 121/71 -- -- -- -- 98 %   22 1845 127/75 -- -- -- -- 95 %   22 1839 135/79 -- -- -- -- 92 %   22 183 135/84 -- -- -- -- --   22 1835 135/83 -- -- -- -- 95 %   22 1833 132/81 -- -- -- -- --   22 1830 130/78 98.6  F (37  C) Oral -- -- --   22 1825 126/69 -- -- -- -- 100 %   22 1815 -- -- -- -- -- 100 %   22 1700 124/73 98.2  F (36.8  C) Oral 106 22 --   22 1530 123/76 97.9  F (36.6  C) Oral 101 18 --   22 1357 125/77 97.9  F (36.6  C) -- -- 18 --   22 1307 -- 98.1  F (36.7  C) Oral -- -- --   22 1204 -- 98.8  F (37.1  C) Oral -- -- --   22 1101 128/74 98.1  F (36.7  C) Oral -- 18 --   22 1024 -- 98.1  F (36.7  C) Oral -- -- --       The amount and color of the lochia is appropriate for the duration of recovery.  The uterine fundus is firm.  Urinary output is adequate.     Lab Results   Component Value Date    AS Negative 2022    GCPCRT Negative 10/15/2020    HGB 9.9 (L) 2022       Immunization History   Administered Date(s) Administered     Influenza Vaccine, 6+MO IM (QUADRIVALENT W/PRESERVATIVES) 10/20/2021       Provider:  Tika Yee CNM      Date:  2022  Time:  9:48 AM    Patient Name:  Cary MILTON Shante  :  2000  MRN:  [id number]

## 2022-02-12 NOTE — ANESTHESIA PREPROCEDURE EVALUATION
Anesthesia Pre-Procedure Evaluation    Patient: Cary Frey   MRN: [id number] : 2000        Preoperative Diagnosis: * No pre-op diagnosis entered *    Procedure : * No procedures listed *          Past Medical History:   Diagnosis Date     Depressive disorder     Tried taking medications 2018, unsure of date     NO ACTIVE PROBLEMS       Past Surgical History:   Procedure Laterality Date     ADENOIDECTOMY       PE TUBES       WISDOM TOOTH EXTRACTION  2019      No Known Allergies   Social History     Tobacco Use     Smoking status: Never Smoker     Smokeless tobacco: Former User   Substance Use Topics     Alcohol use: Not Currently      Wt Readings from Last 1 Encounters:   22 93.4 kg (206 lb)        Anesthesia Evaluation   Pt has had prior anesthetic.     No history of anesthetic complications       ROS/MED HX  ENT/Pulmonary:    (-) asthma   Neurologic:       Cardiovascular:    (-) hypertension   METS/Exercise Tolerance:     Hematologic:       Musculoskeletal:       GI/Hepatic:       Renal/Genitourinary:       Endo:     (+) Obesity,  (-) Type II DM   Psychiatric/Substance Use:     (+) psychiatric history anxiety and depression     Infectious Disease:       Malignancy:       Other:            Physical Exam    Airway        Mallampati: II   TM distance: > 3 FB   Neck ROM: full   Mouth opening: > 3 cm    Respiratory Devices and Support         Dental     Comment: Good dentition, no loose or removable teeth        Cardiovascular             Pulmonary               Other findings:    at 40 wk 1 d. SROM. Presented with prolonged membrane rupture. WBC elevated. Afebrile. VSS.     COVID negative     OUTSIDE LABS:  CBC:   Lab Results   Component Value Date    WBC 24.3 (H) 2022    HGB 12.1 2022    HCT 35.5 2022     2022     BMP: No results found for: NA, POTASSIUM, CHLORIDE, CO2, BUN, CR, GLC  COAGS: No results found for: PTT, INR, FIBR  POC: No results found for:  BGM, HCG, HCGS  HEPATIC: No results found for: ALBUMIN, PROTTOTAL, ALT, AST, GGT, ALKPHOS, BILITOTAL, BILIDIRECT, GEOVANI  OTHER: No results found for: PH, LACT, A1C, LIN, PHOS, MAG, LIPASE, AMYLASE, TSH, T4, T3, CRP, SED    Anesthesia Plan    ASA Status:  2      Anesthesia Type: Epidural.              Consents    Anesthesia Plan(s) and associated risks, benefits, and realistic alternatives discussed. Questions answered and patient/representative(s) expressed understanding.    - Discussed:     - Discussed with:  Patient         Postoperative Care            Comments:    Other Comments: Patient requests labor epidural. Chart reviewed, including labs. Reviewed options and risks with the patient, including but not limited to: bleeding, infection, damage to tissues under the skin (nerves, muscles, blood vessels), hypotension, headache, and epidural failure. Questions answered, consent signed. Patient agrees to elective labor epidural.            Marysol Arndt MD

## 2022-02-12 NOTE — PROGRESS NOTES
Updated Dr. Schwarz on consistently elevated pulse in 120s after delivery. Discussed with him Sepsis alert due to WBCs elevated at 24 on admit. No other signs of infection. Dr. Schwarz would like to consult with Hospitalist for possible EKG to rule out other etiologies for tachycardia. Consult order placed, will continue to monitor.    ESTEFANI Villatoro CNM

## 2022-02-12 NOTE — PLAN OF CARE
Problem: Bleeding (Labor)  Goal: Hemostasis  Outcome: Completed     Problem: Change in Fetal Wellbeing (Labor)  Goal: Stable Fetal Wellbeing  Outcome: Completed     Problem: Delayed Labor Progression (Labor)  Goal: Effective Progression to Delivery  Outcome: Completed     Problem: Infection (Labor)  Goal: Absence of Infection Signs and Symptoms  Outcome: Completed     Problem: Labor Pain (Labor)  Goal: Acceptable Pain Control  Outcome: Completed     Problem: Uterine Tachysystole (Labor)  Goal: Normal Uterine Contraction Pattern  Outcome: Completed   Pt progressed well and pushed effectively. Viable female delivered at 2132. Apgars 9/9.

## 2022-02-12 NOTE — LACTATION NOTE
This note was copied from a baby's chart.  Lactation consultant to patient room to assess breastfeeding. Mom states infant has latched several times since birth, nipple pain on L.    Reviewed benefit of skin to skin prior to feeding to help get baby ready for feeding, importance of feeding baby on early hunger cues, and breastfeeding 8-12 times in 24 hours for optimal infant nutrition and hydration as well as for building an optimal milk supply.  Education given regarding importance of optimal positioning for deep, comfortable latch and effective milk transfer. Gave hands on help with cross cradle hold positioning on L side, and instruction on how to sandwich the nipple tissue to create more of a teat, and infant able to latch deeply. Mom state much improved comfort with this deeper latch. Rhythmic sucking and multiple swallows noted.    Mom states she was doing some hand expression in late pregnancy and is able to express multiple drops of colostrum to feed to baby prior to latch. Instructed on strategies to keep baby active, including breast compression.     Anticipatory guidance given on cluster feeding. Answered questions and gave encouragement.

## 2022-02-12 NOTE — L&D DELIVERY NOTE
Vaginal Delivery Note    Name: Cary Frey  : 2000  MRN: [id number]    PRE DELIVERY DIAGNOSIS  1) 22 year old  1 Para 0 at 40w1d      2) Latent labor with Prolonged PROM    POST DELIVERY DIAGNOSIS  1) 22 year old  @ 40w1d  2) Delivery of a viable female infant weighing 7lb7.6oz via    3) APGARS of 9 at one minute and 9 at five minutes    YOB: 2020     Birth Time: 9:32 PM       Augmentation Yes              Indication: ineffective contraction pattern, prolonged PROM  Induction No                      Indication: none    Monitors: External     GBS: Negative    ROM: PROM  Fluid Type: Clear    Labor Analgesia/Anesthesia:Epidural    Cord pH obtained: No  Placenta Date/Time: 2022  9:34 PM   Placenta submitted to Pathology: No; patient took home    Description of procedure:   22 year old  with PNC / [facility] and pregnancy complicated by prepregnancy BMI=30 presented to L&D with spontaneous rupture of membranes and labor contractions.  Upon arrival to [facility] triage, cervix 2-3/60/-1 at 0533. Recheck at 0900 was 4/70/-1, she also reported at that time she had been leaking fluid since 0200 on . ROM+resulted positive and this was assumed to be rupture time. Labor had stalled out, so Pitocin augmentation begun at 1547, reaching a max of 7mU/minute. She became uncomfortable and requested and epidural at 1742, cervix 6/80/-1 at that time. Epidural placed with excellent relief at 1821. Recheck at  was Complete/+2, began to push at  with strong efforts. Brought baby to a slow controlled crown. Fetal head delivered, followed by left anterior shoulder with gentle downward traction and excellent maternal efforts at . Blessing and her mother, Denise, welcomed Candy Reed. Her hospital course was uncomplicated.  Patient progressed to complete with pitocin   Shoulder Dystocia No  Operative Vaginal Delivery No  Infant spontaneously delivered over an 2nd degree  laceration.   It was repaired in the normal fashion using local anesthesia using 3-0 vicryl.  Infant delivered in SILVIANO position.  Nuchal cord Yes x2-loose   Delivered through    After a 60 second delay, cord was clamped x2 and cut by the infant's mother.  Placenta spontaneously delivered: 2/11/2022  9:34 PM  grossly intact with 3 vessel cord.  Infant:  Live, vigorous infant  was handed to mom.    Delivery was complicated by nothing. Some oozing noted, from uterus just prior to repair-so rectal Cytotec given and gloves changed prior to repair. Interventions included fundal massage, pitocin and cytotec.    Delivery QBL (mL): 150    Mother and Infant stable.    Dr. Schwarz was available for consultation and collaboration throughout the entirety of labor and delivery.    ESTEFANI Villatoro CNM    2/11/2022 10:42 PM

## 2022-02-13 VITALS
SYSTOLIC BLOOD PRESSURE: 102 MMHG | TEMPERATURE: 97.7 F | HEART RATE: 86 BPM | OXYGEN SATURATION: 99 % | RESPIRATION RATE: 18 BRPM | DIASTOLIC BLOOD PRESSURE: 70 MMHG | WEIGHT: 206 LBS | HEIGHT: 60 IN | BODY MASS INDEX: 40.44 KG/M2

## 2022-02-13 LAB
BASOPHILS # BLD AUTO: 0.1 10E3/UL (ref 0–0.2)
BASOPHILS NFR BLD AUTO: 1 %
EOSINOPHIL # BLD AUTO: 0.4 10E3/UL (ref 0–0.7)
EOSINOPHIL NFR BLD AUTO: 3 %
ERYTHROCYTE [DISTWIDTH] IN BLOOD BY AUTOMATED COUNT: 16.5 % (ref 10–15)
HCT VFR BLD AUTO: 32.4 % (ref 35–47)
HGB BLD-MCNC: 10.8 G/DL (ref 11.7–15.7)
IMM GRANULOCYTES # BLD: 0.2 10E3/UL
IMM GRANULOCYTES NFR BLD: 1 %
LYMPHOCYTES # BLD AUTO: 2.5 10E3/UL (ref 0.8–5.3)
LYMPHOCYTES NFR BLD AUTO: 17 %
MCH RBC QN AUTO: 29.6 PG (ref 26.5–33)
MCHC RBC AUTO-ENTMCNC: 33.3 G/DL (ref 31.5–36.5)
MCV RBC AUTO: 89 FL (ref 78–100)
MONOCYTES # BLD AUTO: 1 10E3/UL (ref 0–1.3)
MONOCYTES NFR BLD AUTO: 7 %
NEUTROPHILS # BLD AUTO: 10.7 10E3/UL (ref 1.6–8.3)
NEUTROPHILS NFR BLD AUTO: 71 %
NRBC # BLD AUTO: 0 10E3/UL
NRBC BLD AUTO-RTO: 0 /100
PLATELET # BLD AUTO: 229 10E3/UL (ref 150–450)
RBC # BLD AUTO: 3.65 10E6/UL (ref 3.8–5.2)
WBC # BLD AUTO: 14.6 10E3/UL (ref 4–11)

## 2022-02-13 PROCEDURE — 250N000013 HC RX MED GY IP 250 OP 250 PS 637: Performed by: ADVANCED PRACTICE MIDWIFE

## 2022-02-13 PROCEDURE — 99207 PR NON-BILLABLE SERV PER CHARTING: CPT | Performed by: INTERNAL MEDICINE

## 2022-02-13 PROCEDURE — 85025 COMPLETE CBC W/AUTO DIFF WBC: CPT | Performed by: INTERNAL MEDICINE

## 2022-02-13 PROCEDURE — 36415 COLL VENOUS BLD VENIPUNCTURE: CPT | Performed by: INTERNAL MEDICINE

## 2022-02-13 RX ORDER — DOCUSATE SODIUM 100 MG/1
100 CAPSULE, LIQUID FILLED ORAL DAILY
COMMUNITY
Start: 2022-02-14 | End: 2022-12-26

## 2022-02-13 RX ORDER — ACETAMINOPHEN 325 MG/1
650 TABLET ORAL EVERY 4 HOURS PRN
COMMUNITY
Start: 2022-02-13 | End: 2022-12-26

## 2022-02-13 RX ORDER — IBUPROFEN 800 MG/1
800 TABLET, FILM COATED ORAL EVERY 6 HOURS PRN
COMMUNITY
Start: 2022-02-13 | End: 2022-12-26

## 2022-02-13 RX ADMIN — DOCUSATE SODIUM 100 MG: 100 CAPSULE, LIQUID FILLED ORAL at 08:12

## 2022-02-13 RX ADMIN — FERROUS SULFATE TAB 325 MG (65 MG ELEMENTAL FE) 325 MG: 325 (65 FE) TAB at 08:12

## 2022-02-13 RX ADMIN — IBUPROFEN 800 MG: 800 TABLET, FILM COATED ORAL at 20:58

## 2022-02-13 RX ADMIN — ACETAMINOPHEN 650 MG: 325 TABLET ORAL at 13:27

## 2022-02-13 RX ADMIN — IBUPROFEN 800 MG: 800 TABLET, FILM COATED ORAL at 05:26

## 2022-02-13 RX ADMIN — ACETAMINOPHEN 650 MG: 325 TABLET ORAL at 05:25

## 2022-02-13 NOTE — PLAN OF CARE
Blessing's VSS.  She's ambulating and voiding without difficulty.  She has been pumping every 3 hours,  giving small amounts of colostrum to baby and supplementing with donor milk.  She hasn't been putting baby to breast because of sore, red nipples, but she says that the pump is not painful for her.  Blessing's taking tylenol and ibuprofen for perineal and uterine cramping pain.  Her mother is in the room with her and she is very helpful and supportive.

## 2022-02-13 NOTE — PLAN OF CARE
Problem: Adult Inpatient Plan of Care  Goal: Optimal Comfort and Wellbeing  Outcome: Adequate for Discharge  Intervention: Provide Person-Centered Care  Recent Flowsheet Documentation  Taken 2/13/2022 0811 by Ranjana Carroll RN  Trust Relationship/Rapport:   care explained   reassurance provided     Problem: Pain (Postpartum Vaginal Delivery)  Goal: Acceptable Pain Control  Outcome: Adequate for Discharge  Intervention: Prevent or Manage Pain  Recent Flowsheet Documentation  Taken 2/13/2022 0811 by Ranjana Carroll RN  Complementary Therapy: (lavender bath salts) other (see comments)     Patient is doing well this shift. VSS, voiding without difficulty. Patient is up independently in room. Patient is pumping and feeding baby expressed milk, patient states that her nipples feel better but is still allowing them to rest and is not attempting to breastfeed. Patient is taking tylenol to manage pain which she states is effective. Patient will be discharged, but will be bordering in her room with baby as baby requires overnight stay for hyperbilirubinemia.

## 2022-02-13 NOTE — DISCHARGE SUMMARY
OB Discharge Summary      Date:  2022    Name:  Cary Frey  :  2000  MRN:  [id number]      Admission Date:  2022  Delivery Date: 2022  Gestational Age at Delivery:  40w1d  Discharge Date:  2022    Principal Diagnosis:    Patient Active Problem List   Diagnosis     Encounter for triage in pregnant patient     Normal labor     Tachycardia       Conditions complicating Pregnancy:  1) BMI 30  2) Hx anxiety/depression, hospitalization for suicidal ideation in   3) Carrier for polycystic kidney disease-FOB not a carrier    Procedure(s) Performed:      Indication for :  n/a  Indication for Induction:  n/a     Condition at Discharge:  stable    Discharge Medications:      Review of your medicines      START taking      Dose / Directions   acetaminophen 325 MG tablet  Commonly known as: TYLENOL  Used for: Care and examination of lactating mother      Dose: 650 mg  Take 2 tablets (650 mg) by mouth every 4 hours as needed for mild pain or fever (greater than or equal to 38  C /100.4  F (oral) or 38.5  C/ 101.4  F (core).)  Refills: 0     docusate sodium 100 MG capsule  Commonly known as: COLACE  Used for: Care and examination of lactating mother      Dose: 100 mg  Start taking on: 2022  Take 1 capsule (100 mg) by mouth daily  Refills: 0     ibuprofen 800 MG tablet  Commonly known as: ADVIL/MOTRIN  Used for: Care and examination of lactating mother      Dose: 800 mg  Take 1 tablet (800 mg) by mouth every 6 hours as needed for other (cramping)  Refills: 0        CONTINUE these medicines which have NOT CHANGED      Dose / Directions   ferrous sulfate 325 (65 Fe) MG tablet  Commonly known as: FEROSUL  Indication: Anemia From Inadequate Iron in the Body      Dose: 325 mg  Take 325 mg by mouth daily (with breakfast)  Refills: 0     prenatal multivitamin w/iron 27-0.8 MG tablet      Dose: 1 tablet  Take 1 tablet by mouth daily  Refills: 0           Where to get  your medicines      Some of these will need a paper prescription and others can be bought over the counter. Ask your nurse if you have questions.    You don't need a prescription for these medications    acetaminophen 325 MG tablet    docusate sodium 100 MG capsule    ibuprofen 800 MG tablet          Discharge Plan:    Follow up with /ROSMERY:  [facility] [place]   Patient Instructions:      Physical activity: as tolerated    Diet:  regular    Medication:  As listed above        Physician/ALEJANDRINAM:  Tika Yee CNM    Name:  Cary Frey  :  2000  MRN:  [id number]

## 2022-02-13 NOTE — ANESTHESIA POSTPROCEDURE EVALUATION
Patient: Cary Frey    Procedure: * No procedures listed *       Diagnosis:* No pre-op diagnosis entered *  Diagnosis Additional Information: No value filed.    Anesthesia Type:  Epidural    Note:  Disposition: Inpatient   Postop Pain Control: Uneventful            Sign Out: Well controlled pain   PONV: No   Neuro/Psych: Uneventful            Sign Out: Acceptable/Baseline neuro status   Airway/Respiratory: Uneventful            Sign Out: Acceptable/Baseline resp. status   CV/Hemodynamics: Uneventful            Sign Out: Acceptable CV status; No obvious hypovolemia; No obvious fluid overload   Other NRE: NONE   DID A NON-ROUTINE EVENT OCCUR? No    Event details/Postop Comments:  Epidural has worn off. Pain well-controlled. Patient denies any headache. Patient is ambulating and voiding urine without issue.            Last vitals:  Vitals Value Taken Time   BP     Temp     Pulse     Resp     SpO2         Electronically Signed By: Marysol Arndt MD  February 12, 2022  7:04 PM

## 2022-02-13 NOTE — PROGRESS NOTES
Vaginal Delivery Postpartum Day 2    Patient Name:  Cary Frey  :      2000  MRN:      [id number]      Assessment:  Normal postpartum course. Tachycardia has resolved.    Plan:  Continue current care.     Subjective:  The patient feels well:  Voiding without difficulty, lochia normal, tolerating normal diet, and passing flatus.  Pain is well controlled with current medications.  The patient has no emotional concerns.  The baby is well and being fed by both breast and bottle.    Objective:  /70 (BP Location: Left arm, Patient Position: Semi-Almonte's)   Pulse 86   Temp 97.7  F (36.5  C) (Oral)   Resp 18   Ht 1.524 m (5')   Wt 93.4 kg (206 lb)   SpO2 99%   Breastfeeding Yes   BMI 40.23 kg/m      .  Patient Vitals for the past 24 hrs:   BP Temp Temp src Pulse Resp SpO2   22 0811 102/70 97.7  F (36.5  C) Oral 86 18 99 %   22 0144 109/70 97.5  F (36.4  C) Oral 86 18 99 %   22 1600 107/73 98  F (36.7  C) Oral 92 16 99 %       The amount and color of the lochia is appropriate for the duration of recovery.  The uterine fundus is firm.  Urinary output is adequate.     Lab Results   Component Value Date    AS Negative 2022    GCPCRT Negative 10/15/2020    HGB 10.8 (L) 2022       Immunization History   Administered Date(s) Administered     Influenza Vaccine, 6+MO IM (QUADRIVALENT W/PRESERVATIVES) 10/20/2021       Provider:  Tika Yee CNM      Date:  2022  Time:  9:44 AM    Patient Name:  Cary Frey  :      2000  MRN:      [id number]

## 2022-02-13 NOTE — PROGRESS NOTES
Chart check.  Leukocytosis reactive and continues to improve.  No evidence of sepsis or acute infectious illness.  No indication for antimicrobial therapy.  Nothing further to add from [facility] standpoint, so will sign off at this time.  Thank you again for allowing [facility] to participate in the care of patient while hospitalized.

## 2022-02-13 NOTE — PLAN OF CARE
Problem: Adjustment to Role Transition (Postpartum Vaginal Delivery)  Goal: Successful Maternal Role Transition  Outcome: Improving     Problem: Pain (Postpartum Vaginal Delivery)  Goal: Acceptable Pain Control  Outcome: Improving       Vitals WNL, tachycardia resolved.   Rated cramping discomfort 3-4/10. Prn tylenol/ibuprofen given.  States breasts/nipples feel much better and has no pain since taking a breastfeeding break.  Pt continues to pump to maintain supply - usually getting 5-6 ml each session.  Supplementing 20-25 ml donor milk approx q2h this shift.

## 2022-02-25 ENCOUNTER — HOSPITAL ENCOUNTER (EMERGENCY)
Facility: HOSPITAL | Age: 22
Discharge: HOME | End: 2022-02-25
Attending: EMERGENCY MEDICINE | Admitting: EMERGENCY MEDICINE
Payer: COMMERCIAL

## 2022-02-25 ENCOUNTER — APPOINTMENT (OUTPATIENT)
Dept: CT IMAGING | Facility: HOSPITAL | Age: 22
End: 2022-02-25
Attending: EMERGENCY MEDICINE
Payer: COMMERCIAL

## 2022-02-25 VITALS
TEMPERATURE: 98.2 F | SYSTOLIC BLOOD PRESSURE: 111 MMHG | RESPIRATION RATE: 23 BRPM | WEIGHT: 189.4 LBS | OXYGEN SATURATION: 97 % | DIASTOLIC BLOOD PRESSURE: 62 MMHG | BODY MASS INDEX: 36.99 KG/M2 | HEART RATE: 79 BPM

## 2022-02-25 DIAGNOSIS — N39.0 URINARY TRACT INFECTION WITH HEMATURIA, SITE UNSPECIFIED: ICD-10-CM

## 2022-02-25 DIAGNOSIS — R31.9 URINARY TRACT INFECTION WITH HEMATURIA, SITE UNSPECIFIED: ICD-10-CM

## 2022-02-25 DIAGNOSIS — M54.6 ACUTE BILATERAL THORACIC BACK PAIN: ICD-10-CM

## 2022-02-25 DIAGNOSIS — R07.89 ATYPICAL CHEST PAIN: ICD-10-CM

## 2022-02-25 PROBLEM — R48.0 DYSLEXIA: Status: ACTIVE | Noted: 2021-04-28

## 2022-02-25 PROBLEM — O26.831: Status: ACTIVE | Noted: 2022-02-25

## 2022-02-25 PROBLEM — E66.9 OBESITY WITH BODY MASS INDEX 30 OR GREATER: Status: ACTIVE | Noted: 2022-02-25

## 2022-02-25 PROBLEM — F32.A DEPRESSION: Status: ACTIVE | Noted: 2021-04-28

## 2022-02-25 PROBLEM — F41.9 ANXIETY: Status: ACTIVE | Noted: 2021-04-28

## 2022-02-25 PROBLEM — Q61.2 AUTOSOMAL DOMINANT POLYCYSTIC KIDNEY DISEASE: Status: ACTIVE | Noted: 2022-02-25

## 2022-02-25 LAB
ALBUMIN SERPL-MCNC: 3.8 G/DL (ref 3.5–5)
ALBUMIN UR-MCNC: 30 MG/DL
ALP SERPL-CCNC: 110 U/L (ref 45–120)
ALT SERPL W P-5'-P-CCNC: 12 U/L (ref 0–45)
ANION GAP SERPL CALCULATED.3IONS-SCNC: 11 MMOL/L (ref 5–18)
APPEARANCE UR: ABNORMAL
AST SERPL W P-5'-P-CCNC: 19 U/L (ref 0–40)
BACTERIA #/AREA URNS HPF: ABNORMAL /HPF
BASOPHILS # BLD AUTO: 0.1 10E3/UL (ref 0–0.2)
BASOPHILS NFR BLD AUTO: 1 %
BILIRUB DIRECT SERPL-MCNC: 0.2 MG/DL
BILIRUB SERPL-MCNC: 0.5 MG/DL (ref 0–1)
BILIRUB UR QL STRIP: NEGATIVE
BNP SERPL-MCNC: <10 PG/ML (ref 0–64)
BUN SERPL-MCNC: 14 MG/DL (ref 8–22)
C REACTIVE PROTEIN LHE: 1.3 MG/DL (ref 0–0.8)
CALCIUM SERPL-MCNC: 9 MG/DL (ref 8.5–10.5)
CHLORIDE BLD-SCNC: 106 MMOL/L (ref 98–107)
CO2 SERPL-SCNC: 23 MMOL/L (ref 22–31)
COLOR UR AUTO: YELLOW
CREAT SERPL-MCNC: 0.81 MG/DL (ref 0.6–1.1)
EOSINOPHIL # BLD AUTO: 0.1 10E3/UL (ref 0–0.7)
EOSINOPHIL NFR BLD AUTO: 1 %
ERYTHROCYTE [DISTWIDTH] IN BLOOD BY AUTOMATED COUNT: 14.6 % (ref 10–15)
GFR SERPL CREATININE-BSD FRML MDRD: >90 ML/MIN/1.73M2
GLUCOSE BLD-MCNC: 88 MG/DL (ref 70–125)
GLUCOSE UR STRIP-MCNC: NEGATIVE MG/DL
HCT VFR BLD AUTO: 37.5 % (ref 35–47)
HGB BLD-MCNC: 12.8 G/DL (ref 11.7–15.7)
HGB UR QL STRIP: ABNORMAL
HOLD SPECIMEN: NORMAL
IMM GRANULOCYTES # BLD: 0 10E3/UL
IMM GRANULOCYTES NFR BLD: 0 %
INR PPP: 0.97 (ref 0.85–1.15)
KETONES UR STRIP-MCNC: NEGATIVE MG/DL
LEUKOCYTE ESTERASE UR QL STRIP: ABNORMAL
LIPASE SERPL-CCNC: 27 U/L (ref 0–52)
LYMPHOCYTES # BLD AUTO: 3.2 10E3/UL (ref 0.8–5.3)
LYMPHOCYTES NFR BLD AUTO: 32 %
MAGNESIUM SERPL-MCNC: 2.1 MG/DL (ref 1.8–2.6)
MCH RBC QN AUTO: 28.9 PG (ref 26.5–33)
MCHC RBC AUTO-ENTMCNC: 34.1 G/DL (ref 31.5–36.5)
MCV RBC AUTO: 85 FL (ref 78–100)
MONOCYTES # BLD AUTO: 0.6 10E3/UL (ref 0–1.3)
MONOCYTES NFR BLD AUTO: 6 %
MUCOUS THREADS #/AREA URNS LPF: PRESENT /LPF
NEUTROPHILS # BLD AUTO: 5.9 10E3/UL (ref 1.6–8.3)
NEUTROPHILS NFR BLD AUTO: 60 %
NITRATE UR QL: NEGATIVE
NRBC # BLD AUTO: 0 10E3/UL
NRBC BLD AUTO-RTO: 0 /100
PH UR STRIP: 6.5 [PH] (ref 5–7)
PLATELET # BLD AUTO: 440 10E3/UL (ref 150–450)
POTASSIUM BLD-SCNC: 3.8 MMOL/L (ref 3.5–5)
PROCALCITONIN SERPL-MCNC: <0.02 NG/ML (ref 0–0.49)
PROT SERPL-MCNC: 7.5 G/DL (ref 6–8)
RBC # BLD AUTO: 4.43 10E6/UL (ref 3.8–5.2)
RBC URINE: >182 /HPF
SODIUM SERPL-SCNC: 140 MMOL/L (ref 136–145)
SP GR UR STRIP: 1.02 (ref 1–1.03)
SQUAMOUS EPITHELIAL: 8 /HPF
TROPONIN I SERPL-MCNC: <0.01 NG/ML (ref 0–0.29)
TSH SERPL DL<=0.005 MIU/L-ACNC: 1.02 UIU/ML (ref 0.3–5)
UROBILINOGEN UR STRIP-MCNC: <2 MG/DL
WBC # BLD AUTO: 9.8 10E3/UL (ref 4–11)
WBC CLUMPS #/AREA URNS HPF: PRESENT /HPF
WBC URINE: 116 /HPF

## 2022-02-25 PROCEDURE — 82248 BILIRUBIN DIRECT: CPT | Performed by: EMERGENCY MEDICINE

## 2022-02-25 PROCEDURE — 82310 ASSAY OF CALCIUM: CPT | Performed by: EMERGENCY MEDICINE

## 2022-02-25 PROCEDURE — 84145 PROCALCITONIN (PCT): CPT | Performed by: EMERGENCY MEDICINE

## 2022-02-25 PROCEDURE — 96374 THER/PROPH/DIAG INJ IV PUSH: CPT | Mod: 59

## 2022-02-25 PROCEDURE — 81001 URINALYSIS AUTO W/SCOPE: CPT | Performed by: EMERGENCY MEDICINE

## 2022-02-25 PROCEDURE — 86140 C-REACTIVE PROTEIN: CPT | Performed by: EMERGENCY MEDICINE

## 2022-02-25 PROCEDURE — 93005 ELECTROCARDIOGRAM TRACING: CPT | Performed by: EMERGENCY MEDICINE

## 2022-02-25 PROCEDURE — 258N000003 HC RX IP 258 OP 636: Performed by: EMERGENCY MEDICINE

## 2022-02-25 PROCEDURE — 84443 ASSAY THYROID STIM HORMONE: CPT | Performed by: EMERGENCY MEDICINE

## 2022-02-25 PROCEDURE — 99285 EMERGENCY DEPT VISIT HI MDM: CPT | Mod: 25

## 2022-02-25 PROCEDURE — 83690 ASSAY OF LIPASE: CPT | Performed by: EMERGENCY MEDICINE

## 2022-02-25 PROCEDURE — 87086 URINE CULTURE/COLONY COUNT: CPT | Performed by: EMERGENCY MEDICINE

## 2022-02-25 PROCEDURE — 85610 PROTHROMBIN TIME: CPT | Performed by: EMERGENCY MEDICINE

## 2022-02-25 PROCEDURE — 250N000013 HC RX MED GY IP 250 OP 250 PS 637: Performed by: EMERGENCY MEDICINE

## 2022-02-25 PROCEDURE — 84484 ASSAY OF TROPONIN QUANT: CPT | Performed by: EMERGENCY MEDICINE

## 2022-02-25 PROCEDURE — 71275 CT ANGIOGRAPHY CHEST: CPT

## 2022-02-25 PROCEDURE — 250N000011 HC RX IP 250 OP 636: Performed by: EMERGENCY MEDICINE

## 2022-02-25 PROCEDURE — 85025 COMPLETE CBC W/AUTO DIFF WBC: CPT | Performed by: EMERGENCY MEDICINE

## 2022-02-25 PROCEDURE — 83735 ASSAY OF MAGNESIUM: CPT | Performed by: EMERGENCY MEDICINE

## 2022-02-25 PROCEDURE — 83880 ASSAY OF NATRIURETIC PEPTIDE: CPT | Performed by: EMERGENCY MEDICINE

## 2022-02-25 PROCEDURE — 36415 COLL VENOUS BLD VENIPUNCTURE: CPT | Performed by: EMERGENCY MEDICINE

## 2022-02-25 RX ORDER — CEFTRIAXONE 2 G/1
2 INJECTION, POWDER, FOR SOLUTION INTRAMUSCULAR; INTRAVENOUS ONCE
Status: DISCONTINUED | OUTPATIENT
Start: 2022-02-25 | End: 2022-02-25

## 2022-02-25 RX ORDER — CEFDINIR 300 MG/1
300 CAPSULE ORAL ONCE
Status: COMPLETED | OUTPATIENT
Start: 2022-02-25 | End: 2022-02-25

## 2022-02-25 RX ORDER — IOPAMIDOL 755 MG/ML
100 INJECTION, SOLUTION INTRAVASCULAR ONCE
Status: COMPLETED | OUTPATIENT
Start: 2022-02-25 | End: 2022-02-25

## 2022-02-25 RX ORDER — CEFDINIR 300 MG/1
300 CAPSULE ORAL 2 TIMES DAILY
Qty: 20 CAPSULE | Refills: 0 | Status: SHIPPED | OUTPATIENT
Start: 2022-02-25 | End: 2022-03-07

## 2022-02-25 RX ADMIN — IOPAMIDOL 100 ML: 755 INJECTION, SOLUTION INTRAVENOUS at 21:48

## 2022-02-25 RX ADMIN — SODIUM CHLORIDE 500 ML: 9 INJECTION, SOLUTION INTRAVENOUS at 21:58

## 2022-02-25 RX ADMIN — CEFDINIR 300 MG: 300 CAPSULE ORAL at 23:02

## 2022-02-25 ASSESSMENT — ENCOUNTER SYMPTOMS
RHINORRHEA: 1
ABDOMINAL PAIN: 0
NAUSEA: 1
VOMITING: 0
COUGH: 0
BACK PAIN: 1
FEVER: 0

## 2022-02-26 LAB
ATRIAL RATE - MUSE: 77 BPM
DIASTOLIC BLOOD PRESSURE - MUSE: NORMAL MMHG
INTERPRETATION ECG - MUSE: NORMAL
P AXIS - MUSE: 49 DEGREES
PR INTERVAL - MUSE: 134 MS
QRS DURATION - MUSE: 104 MS
QT - MUSE: 392 MS
QTC - MUSE: 443 MS
R AXIS - MUSE: 44 DEGREES
SYSTOLIC BLOOD PRESSURE - MUSE: NORMAL MMHG
T AXIS - MUSE: 18 DEGREES
VENTRICULAR RATE- MUSE: 77 BPM

## 2022-02-26 NOTE — DISCHARGE INSTRUCTIONS
Take the antibiotic (cefdinir) as prescribed for the urine infection.    Continue to drink plenty of fluids to stay hydrated.    As needed for pain control at home, take:  - over-the-counter ibuprofen 800mg by mouth every 8 hours (max dose 2400mg in 24 hours)  - over-the-counter acetaminophen 1g by mouth every 6 hours (max dose 4g in 24 hours)    Follow up with your OB provider in 2 days for a recheck.    Return to the Emergency Department for any difficulty breathing, severe worsening, or any other concerns.

## 2022-02-26 NOTE — ED TRIAGE NOTES
2 weeks postpartum vag delivery; breastfeeding; holding daughter this evening and felt some mid back pain with radiation through to the front of her chest starting around 2030. Pain continues; took tylenol 500mg at 2040. Hurts to take a breath.

## 2022-02-26 NOTE — ED PROVIDER NOTES
"EMERGENCY DEPARTMENT ENCOUNTER      NAME: Cary Frey  AGE: 22 year old female  YOB: 2000  MRN: [id number]  EVALUATION DATE & TIME: 2/25/2022  8:58 PM    PCP: Pediatric[facility], [place]    ED PROVIDER: Alonso Dixon M.D.      Chief Complaint   Patient presents with     Back Pain         IMPRESSION  1. Acute bilateral thoracic back pain    2. Atypical chest pain    3. Postpartum state    4. Urinary tract infection with hematuria, site unspecified        PLAN  - cefdinir 300mg q12h x10 days  - NSAIDs for pain  - close OB follow up  - discharge to home    ED COURSE & MEDICAL DECISION MAKING    ED Course as of 02/25/22 2245   Fri Feb 25, 2022   2101 22yoF 2 weeks s/p vaginal delivery (2/11/22 @ 40w1d) with history of anxiety presenting for evaluation of back/chest pain. Was sitting at home feeling fine holding her baby when she developed a \"crampy\" & \"sharp\" pain in her mid back radiating to mid chest; pleuritic and worse with movement. Mild shortness of breath & nausea. Called RN line and directed to the ED. States shortness of breath & nausea resolved and pain noticeably improved prior to ED arrival; minimal symptoms here now. Denies any cough, fevers, sweats, chills, vomiting. No numbness, tingling, focal weakness, difficulty walking. Denies any leg pain/swelling. Took Tylenol for pain prior to coming. Notes that 4 days ago she got over a 3-day course of runny nose; other family members with similar mild symptoms. Patient does note that \"they were watching my blood pressure when I left the hospital after delivering\".    /87 on presentation with otherwise normal vitals. Exam overall unremarkable with clear lungs, normal work of breathing, no chest wall or back tenderness, benign abdomen with no peritoneal signs, no peripheral edema or calf tenderness, normal neuro exam. PE certainly needs ruling out with postpartum pleuritic chest pain; will obtain CT. Will check EKG & troponin as well; " atypical story for primary ACS though. Will recheck BP and obtain blood/urine while giving IVF. Patient declines any pain/nausea medication here now. Patient comfortable with this plan; no further questions at this time.   2212 CT chest with no acute abnormality or explanatory pathology; no PE, pneumothorax, pulmonary edema, infiltrate.   2213 EKG unremarkable with no ST changes; troponin undetectable as well.   2214 BP 110s/60s at this time without intervention; doubt postpartum preeclampsia ongoing at this time.   2214 BNP negative and again CT chest clear; doubt postpartum cardiomyopathy.   2242 LFTs/lipase within normal limits; doubt referred upper abdominal etiology to pain. Labs otherwise unremarkable with WBC 9, no anemia, normal TSH. UA does suggest UTI with WBCs, bacteria; patient does note mild dysuria recently so will cover for UTI---given cefdinir here now and will continue at home. Patient with no ongoing symptoms on recheck with BP 100s/60s; ok for outpatient management. No concern for sepsis, other ongoing emergent life-threatening etiology. Patient comfortable with discharge at this time. Return precautions and need for PCP follow up discussed and understood. No further questions at the time of discharge.       9:11 PM I met with the patient for the initial interview and physical examination. Discussed plan for treatment and workup in the ED.  10:19 PM I rechecked and updated patient on results. Patient is asymptomatic. Blood pressure 100/50's. Patient would like to go home at this time. We discussed the plan for discharge and the patient is agreeable. Reviewed supportive cares, symptomatic treatment, outpatient follow up, and reasons to return to the Emergency Department. Patient to be discharged by ED RN.       This patient involved a high degree of complexity in medical decision making, as significant risks were present and assessed.    Broad differential considered for this patient presenting,  including but not limited to:  ACS, PE, aortic dissection, prior to arrival, CHF, postpartum cardiomyopathy, preeclampsia, pneumothorax, pulmonary edema, pneumonia, pancreatitis, hepatitis, cholecystitis, pyelonephritis, cystitis, sepsis, muscular pain    I wore the following PPE during this patient encounter:  N95 mask, face shield w/ eye protection, gloves    MEDICATIONS GIVEN IN THE EMERGENCY DEPARTMENT  Medications   cefdinir (OMNICEF) capsule 300 mg (has no administration in time range)   0.9% sodium chloride BOLUS (500 mLs Intravenous New Bag 2/25/22 2158)   iopamidol (ISOVUE-370) solution 100 mL (100 mLs Intravenous Given 2/25/22 2148)       NEW PRESCRIPTIONS STARTED AT TODAY'S ER VISIT  Current Discharge Medication List      START taking these medications    Details   cefdinir (OMNICEF) 300 MG capsule Take 1 capsule (300 mg) by mouth 2 times daily for 10 days  Qty: 20 capsule, Refills: 0         CONTINUE these medications which have NOT CHANGED    Details   acetaminophen (TYLENOL) 325 MG tablet Take 2 tablets (650 mg) by mouth every 4 hours as needed for mild pain or fever (greater than or equal to 38  C /100.4  F (oral) or 38.5  C/ 101.4  F (core).)    Associated Diagnoses: Care and examination of lactating mother      docusate sodium (COLACE) 100 MG capsule Take 1 capsule (100 mg) by mouth daily    Associated Diagnoses: Care and examination of lactating mother      ferrous sulfate (FEROSUL) 325 (65 Fe) MG tablet Take 325 mg by mouth daily (with breakfast)      ibuprofen (ADVIL/MOTRIN) 800 MG tablet Take 1 tablet (800 mg) by mouth every 6 hours as needed for other (cramping)    Associated Diagnoses: Care and examination of lactating mother      Prenatal Vit-Fe Fumarate-FA (PRENATAL MULTIVITAMIN W/IRON) 27-0.8 MG tablet Take 1 tablet by mouth daily                 =================================================================      HPI  Patient information was obtained from: Patient    Use of :  "N/A       Cary Frey is a 22 year old female with a pertinent history of autosomal dominant polycystic kidney disease, pregnancy related renal disease, anxiety, who presents to this ED via walk-in for evaluation of back pain.    Patient reports that around [id number] tonight, while sitting down watching TV, she had onset of mid back pain that radiated to her chest. She started feeling \"hot\" and notes she went to the bathroom to try stretching, which made pain worse. She also states that pain is worse with deep breaths and movement. Patient reports that back pain has improved since onset but still notes some mild pain. Patient took Tylenol earlier tonight for symptoms. She denies any prior history of this.    Patient reports associating nausea upon initial onset of back pain. Otherwise, she denies any abdominal pain, vomiting, leg swelling, calf pain, cough, and fever. Of note, patient is two weeks post-partum vaginal delivery. She also states she had a cold symptoms including rhinorrhea after she was discharged home but reports that this has now resolved as if 3-4 days ago. No other complaints at this time.    REVIEW OF SYSTEMS   Review of Systems   Constitutional: Negative for fever.   HENT: Positive for rhinorrhea (resolved).    Respiratory: Negative for cough.    Cardiovascular: Positive for chest pain (secondary to back pain). Negative for leg swelling.   Gastrointestinal: Positive for nausea. Negative for abdominal pain and vomiting.   Musculoskeletal: Positive for back pain (mid).        Negative for calf pain   All other systems reviewed and are negative.  All other systems reviewed and are negative except as noted above in HPI.    --------------- MEDICAL HISTORY ---------------  PAST MEDICAL HISTORY:  Past Medical History:   Diagnosis Date     Depressive disorder     Tried taking medications 2018, unsure of date     NO ACTIVE PROBLEMS      Patient Active Problem List   Diagnosis     Encounter for " triage in pregnant patient     Normal labor     Tachycardia     Pregnancy related renal disease, first trimester     Other chronic allergic conjunctivitis     Obesity with body mass index 30 or greater     Dyslexia     Depression     Autosomal dominant polycystic kidney disease     Anxiety     Allergic rhinitis due to pollen     Allergic rhinitis due to other allergen       PAST SURGICAL HISTORY:  Past Surgical History:   Procedure Laterality Date     ADENOIDECTOMY       PE TUBES       WISDOM TOOTH EXTRACTION  2019       CURRENT MEDICATIONS:      Current Facility-Administered Medications:      cefdinir (OMNICEF) capsule 300 mg, 300 mg, Oral, Once, Alonso Dixon MD    Current Outpatient Medications:      cefdinir (OMNICEF) 300 MG capsule, Take 1 capsule (300 mg) by mouth 2 times daily for 10 days, Disp: 20 capsule, Rfl: 0     acetaminophen (TYLENOL) 325 MG tablet, Take 2 tablets (650 mg) by mouth every 4 hours as needed for mild pain or fever (greater than or equal to 38  C /100.4  F (oral) or 38.5  C/ 101.4  F (core).), Disp: , Rfl:      docusate sodium (COLACE) 100 MG capsule, Take 1 capsule (100 mg) by mouth daily, Disp: , Rfl:      ferrous sulfate (FEROSUL) 325 (65 Fe) MG tablet, Take 325 mg by mouth daily (with breakfast), Disp: , Rfl:      ibuprofen (ADVIL/MOTRIN) 800 MG tablet, Take 1 tablet (800 mg) by mouth every 6 hours as needed for other (cramping), Disp: , Rfl:      Prenatal Vit-Fe Fumarate-FA (PRENATAL MULTIVITAMIN W/IRON) 27-0.8 MG tablet, Take 1 tablet by mouth daily, Disp: , Rfl:     ALLERGIES:  No Known Allergies    FAMILY HISTORY:  Family History   Problem Relation Age of Onset     Strabismus Sister      Eye Surgery Sister        SOCIAL HISTORY:   Social History     Socioeconomic History     Marital status: Single     Spouse name: Not on file     Number of children: Not on file     Years of education: Not on file     Highest education level: Not on file   Occupational History     Not on file    Tobacco Use     Smoking status: Never Smoker     Smokeless tobacco: Former User   Substance and Sexual Activity     Alcohol use: Not Currently     Drug use: Not Currently     Sexual activity: Not Currently   Other Topics Concern     Not on file   Social History Narrative     Not on file     Social Determinants of Health     Financial Resource Strain: Not on file   Food Insecurity: Not on file   Transportation Needs: Not on file   Physical Activity: Not on file   Stress: Not on file   Social Connections: Not on file   Intimate Partner Violence: Not on file   Housing Stability: Not on file         --------------- PHYSICAL EXAM ---------------  Nursing notes and vitals reviewed by me.  VITALS:  Vitals:    02/25/22 2141 02/25/22 2155 02/25/22 2200 02/25/22 2215   BP: 111/73 118/68 112/67 107/56   Pulse: 77 81 83 82   Resp: 20 23 22   Temp:       TempSrc:       SpO2: 97% 96% 97% 99%   Weight:           PHYSICAL EXAM:    General:  alert, interactive, no distress  Eyes:  conjunctivae clear, conjugate gaze  HENT:  atraumatic, nose with no rhinorrhea, oropharynx clear  Neck:  no meningismus  Cardiovascular:  HR 80's during exam, regular rhythm, no murmurs, brisk cap refill  Chest:  no chest wall tenderness  Pulmonary:  no stridor, normal phonation, normal work of breathing, clear lungs bilaterally  Abdomen:  soft, nondistended, nontender  :  no CVA tenderness  Back:  no midline spinal tenderness  Musculoskeletal:  no pretibial edema, no calf tenderness. Gross ROM intact to joints of extremities with no obvious deformities.  Skin:  warm, dry, no rash  Neuro:  awake, alert, answers questions appropriately, follows commands, moves all limbs  Psych:  calm, normal affect      --------------- RESULTS ---------------  EKG:    Reviewed and interpreted by me.  - NSR at 77bpm, no ST or T wave changes, ,  with incomplete RBBB pattern, QTc 443  - no priors  My read.    LAB:  Reviewed and interpreted by me.  Results for  orders placed or performed during the hospital encounter of 02/25/22   CT Chest Pulmonary Embolism w Contrast    Impression    IMPRESSION:  1.  There is no pulmonary embolus, aortic aneurysm or dissection. No acute abnormality.   Hepatic function panel   Result Value Ref Range    Bilirubin Total 0.5 0.0 - 1.0 mg/dL    Bilirubin Direct 0.2 <=0.5 mg/dL    Protein Total 7.5 6.0 - 8.0 g/dL    Albumin 3.8 3.5 - 5.0 g/dL    Alkaline Phosphatase 110 45 - 120 U/L    AST 19 0 - 40 U/L    ALT 12 0 - 45 U/L   Basic metabolic panel   Result Value Ref Range    Sodium 140 136 - 145 mmol/L    Potassium 3.8 3.5 - 5.0 mmol/L    Chloride 106 98 - 107 mmol/L    Carbon Dioxide (CO2) 23 22 - 31 mmol/L    Anion Gap 11 5 - 18 mmol/L    Urea Nitrogen 14 8 - 22 mg/dL    Creatinine 0.81 0.60 - 1.10 mg/dL    Calcium 9.0 8.5 - 10.5 mg/dL    Glucose 88 70 - 125 mg/dL    GFR Estimate >90 >60 mL/min/1.73m2   Result Value Ref Range    Magnesium 2.1 1.8 - 2.6 mg/dL   TSH with free T4 reflex   Result Value Ref Range    TSH 1.02 0.30 - 5.00 uIU/mL   B-Type Natriuretic Peptide ([facility] Only)   Result Value Ref Range    BNP <10 0 - 64 pg/mL   CRP inflammation   Result Value Ref Range    CRP 1.3 (H) 0.0-<0.8 mg/dL   Result Value Ref Range    Lipase 27 0 - 52 U/L   Result Value Ref Range    Troponin I <0.01 0.00 - 0.29 ng/mL   CBC with platelets and differential   Result Value Ref Range    WBC Count 9.8 4.0 - 11.0 10e3/uL    RBC Count 4.43 3.80 - 5.20 10e6/uL    Hemoglobin 12.8 11.7 - 15.7 g/dL    Hematocrit 37.5 35.0 - 47.0 %    MCV 85 78 - 100 fL    MCH 28.9 26.5 - 33.0 pg    MCHC 34.1 31.5 - 36.5 g/dL    RDW 14.6 10.0 - 15.0 %    Platelet Count 440 150 - 450 10e3/uL    % Neutrophils 60 %    % Lymphocytes 32 %    % Monocytes 6 %    % Eosinophils 1 %    % Basophils 1 %    % Immature Granulocytes 0 %    NRBCs per 100 WBC 0 <1 /100    Absolute Neutrophils 5.9 1.6 - 8.3 10e3/uL    Absolute Lymphocytes 3.2 0.8 - 5.3 10e3/uL    Absolute Monocytes 0.6 0.0 -  1.3 10e3/uL    Absolute Eosinophils 0.1 0.0 - 0.7 10e3/uL    Absolute Basophils 0.1 0.0 - 0.2 10e3/uL    Absolute Immature Granulocytes 0.0 <=0.4 10e3/uL    Absolute NRBCs 0.0 10e3/uL   Result Value Ref Range    INR 0.97 0.85 - 1.15   UA with Microscopic reflex to Culture    Specimen: Urine, Clean Catch   Result Value Ref Range    Color Urine Yellow Colorless, Straw, Light Yellow, Yellow    Appearance Urine Turbid (A) Clear    Glucose Urine Negative Negative mg/dL    Bilirubin Urine Negative Negative    Ketones Urine Negative Negative mg/dL    Specific Gravity Urine 1.019 1.001 - 1.030    Blood Urine >1.0 mg/dL (A) Negative    pH Urine 6.5 5.0 - 7.0    Protein Albumin Urine 30  (A) Negative mg/dL    Urobilinogen Urine <2.0 <2.0 mg/dL    Nitrite Urine Negative Negative    Leukocyte Esterase Urine 500 Elliott/uL (A) Negative    Bacteria Urine Few (A) None Seen /HPF    WBC Clumps Urine Present (A) None Seen /HPF    Mucus Urine Present (A) None Seen /LPF    RBC Urine >182 (H) <=2 /HPF    WBC Urine 116 (H) <=5 /HPF    Squamous Epithelials Urine 8 (H) <=1 /HPF   Extra Blue Top Tube   Result Value Ref Range    Hold Specimen JIC    Extra Red Top Tube   Result Value Ref Range    Hold Specimen JIC    Extra Purple Top Tube   Result Value Ref Range    Hold Specimen JIC        RADIOLOGY:  Reviewed by me. Please see official radiology report.  Recent Results (from the past 24 hour(s))   CT Chest Pulmonary Embolism w Contrast    Narrative    EXAM: CT CHEST PULMONARY EMBOLISM W CONTRAST  LOCATION: [facility]  DATE/TIME: 2/25/2022 9:47 PM    INDICATION: Pleuritic chest pain. Postpartum.  COMPARISON: None.  TECHNIQUE: CT chest pulmonary angiogram during arterial phase injection of IV contrast. Multiplanar reformats and MIP reconstructions were performed. Dose reduction techniques were used.   CONTRAST: isovue 370 100ml    FINDINGS:  ANGIOGRAM CHEST: Pulmonary arteries are normal caliber and negative for pulmonary  emboli. Thoracic aorta is negative for dissection. No CT evidence of right heart strain.    LUNGS AND PLEURA: The lungs are clear. No pneumothorax or pleural effusion.    MEDIASTINUM/AXILLAE: Normal.    CORONARY ARTERY CALCIFICATION: None.    UPPER ABDOMEN: Normal.    MUSCULOSKELETAL: Normal.      Impression    IMPRESSION:  1.  There is no pulmonary embolus, aortic aneurysm or dissection. No acute abnormality.       PROCEDURES:   Procedures   --------------------------------------------------------------------------------   Cardiac telemetry monitoring ordered, reviewed, and interpreted by me while patient was in the Emergency Department. Revealed no acute abnormalities.  --------------------------------------------------------------------------------           I, Martina Ac, am serving as a scribe to document services personally performed by Dr. Alonso Dixon based on my observation and the provider's statements to me. I, Alonso Dixon MD attest that Martina Ac is acting in a scribe capacity, has observed my performance of the services and has documented them in accordance with my direction.      Alonso Dixon MD  02/25/22  Emergency Medicine  [facility] EMERGENCY DEPARTMENT  [address] [place] [address] [phone] [phone]  Dept: [phone]     Alonso Dixon MD  02/25/22 6346

## 2022-02-27 LAB — BACTERIA UR CULT: NORMAL

## 2022-05-03 ENCOUNTER — TELEPHONE (OUTPATIENT)
Dept: FAMILY MEDICINE | Facility: CLINIC | Age: 22
End: 2022-05-03

## 2022-05-03 ENCOUNTER — OFFICE VISIT (OUTPATIENT)
Dept: FAMILY MEDICINE | Facility: CLINIC | Age: 22
End: 2022-05-03
Payer: COMMERCIAL

## 2022-05-03 VITALS
BODY MASS INDEX: 36.63 KG/M2 | HEART RATE: 97 BPM | DIASTOLIC BLOOD PRESSURE: 71 MMHG | WEIGHT: 186.6 LBS | OXYGEN SATURATION: 97 % | SYSTOLIC BLOOD PRESSURE: 109 MMHG | TEMPERATURE: 99.4 F | HEIGHT: 60 IN

## 2022-05-03 DIAGNOSIS — R51.9 INTERMITTENT HEADACHE: ICD-10-CM

## 2022-05-03 DIAGNOSIS — R50.81 FEVER IN OTHER DISEASES: ICD-10-CM

## 2022-05-03 DIAGNOSIS — N64.4 BREAST PAIN: ICD-10-CM

## 2022-05-03 DIAGNOSIS — N64.89 OCCLUSION OF BREAST DUCT: Primary | ICD-10-CM

## 2022-05-03 PROBLEM — D50.9 IRON DEFICIENCY ANEMIA: Status: ACTIVE | Noted: 2022-05-03

## 2022-05-03 LAB
BASOPHILS # BLD AUTO: 0 10E3/UL (ref 0–0.2)
BASOPHILS NFR BLD AUTO: 0 %
EOSINOPHIL # BLD AUTO: 0 10E3/UL (ref 0–0.7)
EOSINOPHIL NFR BLD AUTO: 0 %
ERYTHROCYTE [DISTWIDTH] IN BLOOD BY AUTOMATED COUNT: 14.1 % (ref 10–15)
HCT VFR BLD AUTO: 35.8 % (ref 35–47)
HGB BLD-MCNC: 12.2 G/DL (ref 11.7–15.7)
IMM GRANULOCYTES # BLD: 0 10E3/UL
IMM GRANULOCYTES NFR BLD: 0 %
LYMPHOCYTES # BLD AUTO: 1.3 10E3/UL (ref 0.8–5.3)
LYMPHOCYTES NFR BLD AUTO: 10 %
MCH RBC QN AUTO: 27.6 PG (ref 26.5–33)
MCHC RBC AUTO-ENTMCNC: 34.1 G/DL (ref 31.5–36.5)
MCV RBC AUTO: 81 FL (ref 78–100)
MONOCYTES # BLD AUTO: 0.8 10E3/UL (ref 0–1.3)
MONOCYTES NFR BLD AUTO: 7 %
NEUTROPHILS # BLD AUTO: 10.5 10E3/UL (ref 1.6–8.3)
NEUTROPHILS NFR BLD AUTO: 83 %
PLATELET # BLD AUTO: 262 10E3/UL (ref 150–450)
RBC # BLD AUTO: 4.42 10E6/UL (ref 3.8–5.2)
WBC # BLD AUTO: 12.7 10E3/UL (ref 4–11)

## 2022-05-03 PROCEDURE — 36415 COLL VENOUS BLD VENIPUNCTURE: CPT | Performed by: FAMILY MEDICINE

## 2022-05-03 PROCEDURE — 99203 OFFICE O/P NEW LOW 30 MIN: CPT | Performed by: FAMILY MEDICINE

## 2022-05-03 PROCEDURE — 85025 COMPLETE CBC W/AUTO DIFF WBC: CPT | Performed by: FAMILY MEDICINE

## 2022-05-03 RX ORDER — PNV NO.95/FERROUS FUM/FOLIC AC 28MG-0.8MG
TABLET ORAL
COMMUNITY
End: 2022-05-06

## 2022-05-03 RX ORDER — ESCITALOPRAM OXALATE 10 MG/1
10 TABLET ORAL DAILY
COMMUNITY
Start: 2022-04-05 | End: 2022-12-26

## 2022-05-03 NOTE — TELEPHONE ENCOUNTER
----- Message from Sandor Rivera MD sent at 5/3/2022 10:13 AM CDT -----  Please inform patient that her white count was higher.  I would like her to start taking antibiotic and follow-up in clinic as suggested.    Sandor Rivera MD

## 2022-05-03 NOTE — PROGRESS NOTES
Assessment & Plan     ICD-10-CM    1. Occlusion of breast duct  N64.89 CBC with platelets and differential     CBC with platelets and differential   2. Intermittent headache  R51.9    3. Breast pain  N64.4    4. Fever in other diseases  R50.81      Medical decision making: Patient is here today for right breast tenderness, blocked duct and fever.  Noted low-grade fever in clinic.  The occluded breast duct was visualized and scraped with a depth of 25-gauge needle with immediate relief.  Subsequent breast massage and milk was expressed  .  Patient already has antibiotic that has been called in for her with a televisit.  I would like her to start on antibiotics.  Today we discussed her headache.  Common triggers for migraines were discussed.  At this time I would like her to keep a log of her headaches and come for a follow-up.  Continue to use rescue medication for now.         BMI:   Estimated body mass index is 36.44 kg/m  as calculated from the following:    Height as of this encounter: 1.524 m (5').    Weight as of this encounter: 84.6 kg (186 lb 9.6 oz).       MEDICATIONS:  Continue current medications without change  Patient Instructions   It appears that you had a blocked breast duct which was unblocked today.    We will check your white count today.    Start on the antibiotics that have already been called in for you.    Follow-up in 2 days for recheck.      Strategies to help prevent/decrease headache triggers:   -improve sleep hygiene   -start eating breakfast, do not skip meals   -good fluid intake; avoid caffeine (may help acutely, then may rebound)   -stress reduction; working with mental health, recommendations   -daily gentle exercise encouraged (walking, yoga, stretching, etc.)   -limit exposure to screens of entertainment to max 1-2 hrs/day; avoid the last hour before sleep   -rescue med use reviewed; max 2/day and max use 2 days/week   (treat only the strongest headaches)     Ophthalmology - eye  "exam; note if correction needed.     Keep headache log and please bring it at time of recheck.     Call anytime if questions or concerns.          Return in about 2 days (around 5/5/2022) for Follow up.    Sandor Rivera MD  [facility]    Subjective    Chief Complaint   Patient presents with     Breast Problem     Right breast tenderness, X2ish days.        Blessing is a 22 year old who presents for the following health issues     History of Present Illness       Migraines:   Since the patient's last clinic visit, headaches are: worsened  The patient is getting headaches:  2 times a week  She is able to do normal daily activities when she has a migraine.  The patient is taking the following rescue/relief medications:  Tylenol   Patient states \"I get some relief\" from the rescue/relief medications.   The patient is taking the following medications to prevent migraines:  No medications to prevent migraines  In the past 4 weeks, the patient has gone to an Urgent Care or Emergency Room 0 times times due to headaches.    She eats 0-1 servings of fruits and vegetables daily.She consumes 1 sweetened beverage(s) daily.She exercises with enough effort to increase her heart rate 9 or less minutes per day.  She exercises with enough effort to increase her heart rate 3 or less days per week.   She is taking medications regularly.       Patient Active Problem List   Diagnosis     Encounter for triage in pregnant patient     Normal labor     Tachycardia     Pregnancy related renal disease, first trimester     Other chronic allergic conjunctivitis     Obesity with body mass index 30 or greater     Dyslexia     Depression     Autosomal dominant polycystic kidney disease     Anxiety     Allergic rhinitis due to pollen     Allergic rhinitis due to other allergen     Iron deficiency anemia         Review of Systems   Constitutional, HEENT, cardiovascular, pulmonary, gi and gu systems are negative, except " as otherwise noted.      Objective    /71 (BP Location: Left arm, Patient Position: Sitting, Cuff Size: Adult Large)   Pulse 97   Temp 99.4  F (37.4  C)   Ht 1.524 m (5')   Wt 84.6 kg (186 lb 9.6 oz)   SpO2 97%   BMI 36.44 kg/m    Body mass index is 36.44 kg/m .  Physical Exam   GENERAL: healthy, alert and no distress  BREAST: Right breast is full in the lateral quadrant.  Noted blocked duct in the right side of areola     Procedure: Noted blocked duct was gently explored using a 25-gauge needle.  Breastmilk expressed with massage.  Patient had immediate relief from procedure  NEURO: Normal strength and tone, mentation intact and speech normal  PSYCH: mentation appears normal, affect normal/bright    Results for orders placed or performed in visit on 05/03/22   CBC with platelets and differential     Status: Abnormal   Result Value Ref Range    WBC Count 12.7 (H) 4.0 - 11.0 10e3/uL    RBC Count 4.42 3.80 - 5.20 10e6/uL    Hemoglobin 12.2 11.7 - 15.7 g/dL    Hematocrit 35.8 35.0 - 47.0 %    MCV 81 78 - 100 fL    MCH 27.6 26.5 - 33.0 pg    MCHC 34.1 31.5 - 36.5 g/dL    RDW 14.1 10.0 - 15.0 %    Platelet Count 262 150 - 450 10e3/uL    % Neutrophils 83 %    % Lymphocytes 10 %    % Monocytes 7 %    % Eosinophils 0 %    % Basophils 0 %    % Immature Granulocytes 0 %    Absolute Neutrophils 10.5 (H) 1.6 - 8.3 10e3/uL    Absolute Lymphocytes 1.3 0.8 - 5.3 10e3/uL    Absolute Monocytes 0.8 0.0 - 1.3 10e3/uL    Absolute Eosinophils 0.0 0.0 - 0.7 10e3/uL    Absolute Basophils 0.0 0.0 - 0.2 10e3/uL    Absolute Immature Granulocytes 0.0 <=0.4 10e3/uL   CBC with platelets and differential     Status: Abnormal    Narrative    The following orders were created for panel order CBC with platelets and differential.  Procedure                               Abnormality         Status                     ---------                               -----------         ------                     CBC with platelets and  d...[[id number]]  Abnormal            Final result                 Please view results for these tests on the individual orders.

## 2022-05-03 NOTE — PATIENT INSTRUCTIONS
It appears that you had a blocked breast duct which was unblocked today.    We will check your white count today.    Start on the antibiotics that have already been called in for you.    Follow-up in 2 days for recheck.      Strategies to help prevent/decrease headache triggers:   -improve sleep hygiene   -start eating breakfast, do not skip meals   -good fluid intake; avoid caffeine (may help acutely, then may rebound)   -stress reduction; working with mental health, recommendations   -daily gentle exercise encouraged (walking, yoga, stretching, etc.)   -limit exposure to screens of entertainment to max 1-2 hrs/day; avoid the last hour before sleep   -rescue med use reviewed; max 2/day and max use 2 days/week   (treat only the strongest headaches)     Ophthalmology - eye exam; note if correction needed.     Keep headache log and please bring it at time of recheck.     Call anytime if questions or concerns.

## 2022-05-06 ENCOUNTER — OFFICE VISIT (OUTPATIENT)
Dept: FAMILY MEDICINE | Facility: CLINIC | Age: 22
End: 2022-05-06
Payer: COMMERCIAL

## 2022-05-06 VITALS
WEIGHT: 181.38 LBS | SYSTOLIC BLOOD PRESSURE: 108 MMHG | DIASTOLIC BLOOD PRESSURE: 68 MMHG | HEIGHT: 60 IN | BODY MASS INDEX: 35.61 KG/M2 | HEART RATE: 72 BPM

## 2022-05-06 DIAGNOSIS — N61.0 MASTITIS: Primary | ICD-10-CM

## 2022-05-06 PROCEDURE — 99212 OFFICE O/P EST SF 10 MIN: CPT | Performed by: FAMILY MEDICINE

## 2022-05-06 RX ORDER — DICLOXACILLIN SODIUM 500 MG
CAPSULE ORAL
COMMUNITY
Start: 2022-05-02 | End: 2022-12-26

## 2022-05-06 ASSESSMENT — ANXIETY QUESTIONNAIRES
5. BEING SO RESTLESS THAT IT IS HARD TO SIT STILL: NOT AT ALL
3. WORRYING TOO MUCH ABOUT DIFFERENT THINGS: NOT AT ALL
GAD7 TOTAL SCORE: 0
2. NOT BEING ABLE TO STOP OR CONTROL WORRYING: NOT AT ALL
GAD7 TOTAL SCORE: 0
1. FEELING NERVOUS, ANXIOUS, OR ON EDGE: NOT AT ALL
7. FEELING AFRAID AS IF SOMETHING AWFUL MIGHT HAPPEN: NOT AT ALL
GAD7 TOTAL SCORE: 0
8. IF YOU CHECKED OFF ANY PROBLEMS, HOW DIFFICULT HAVE THESE MADE IT FOR YOU TO DO YOUR WORK, TAKE CARE OF THINGS AT HOME, OR GET ALONG WITH OTHER PEOPLE?: NOT DIFFICULT AT ALL
4. TROUBLE RELAXING: NOT AT ALL
7. FEELING AFRAID AS IF SOMETHING AWFUL MIGHT HAPPEN: NOT AT ALL
6. BECOMING EASILY ANNOYED OR IRRITABLE: NOT AT ALL

## 2022-05-06 ASSESSMENT — PATIENT HEALTH QUESTIONNAIRE - PHQ9
10. IF YOU CHECKED OFF ANY PROBLEMS, HOW DIFFICULT HAVE THESE PROBLEMS MADE IT FOR YOU TO DO YOUR WORK, TAKE CARE OF THINGS AT HOME, OR GET ALONG WITH OTHER PEOPLE: NOT DIFFICULT AT ALL
SUM OF ALL RESPONSES TO PHQ QUESTIONS 1-9: 0
SUM OF ALL RESPONSES TO PHQ QUESTIONS 1-9: 0

## 2022-05-06 NOTE — PROGRESS NOTES
Assessment & Plan     1. Mastitis  Recent mastitis, improving after treatment with dicloxacillin.  She is completed antibiotic treatment.  Milk supply is slightly decreased, she is exclusively pumping and bottlefeeding.  Discussed ways to help decrease risk of recurrence.  Encouraged good hydration, nutrition, sleep as able, and regular pumping to help maintain or regain dip in supply.    Return in about 8 months (around 1/10/2023) for Physical Exam.    Marie Matthews [facility]    Subjective   Blessing is a 22 year old who presents for the following health issues    Chief Complaints and History of Present Illnesses   Patient presents with     Breast Problem     Follow up mastitis.        HPI     12 weeks postpartum, expressing breast-milk, dropped supply and needing to supplement some.   Started on dicloxicillin.     Feeling a lot better than last visit. Temperature was 103 when she came in, headache, fatigued. Feeling so much better.   Right breast symptoms. Right breast was the super  before, now more even.       Review of Systems   See HPI above.       Objective    /68 (BP Location: Left arm, Patient Position: Sitting, Cuff Size: Adult Regular)   Pulse 72   Ht 1.524 m (5')   Wt 82.3 kg (181 lb 6 oz)   LMP  (LMP Unknown)   Breastfeeding Yes   BMI 35.42 kg/m    Body mass index is 35.42 kg/m .  Physical Exam   GENERAL: Blessing is a pleasant, nontoxic female, no acute distress.  BREAST: right breast is slightly engorged, nontender to palpation, no warmth or erythema.

## 2022-05-07 ASSESSMENT — PATIENT HEALTH QUESTIONNAIRE - PHQ9: SUM OF ALL RESPONSES TO PHQ QUESTIONS 1-9: 0

## 2022-05-07 ASSESSMENT — ANXIETY QUESTIONNAIRES: GAD7 TOTAL SCORE: 0

## 2022-09-18 ENCOUNTER — OFFICE VISIT (OUTPATIENT)
Dept: FAMILY MEDICINE | Facility: CLINIC | Age: 22
End: 2022-09-18
Payer: COMMERCIAL

## 2022-09-18 VITALS
WEIGHT: 172.5 LBS | TEMPERATURE: 98.6 F | HEART RATE: 67 BPM | OXYGEN SATURATION: 97 % | SYSTOLIC BLOOD PRESSURE: 98 MMHG | RESPIRATION RATE: 18 BRPM | DIASTOLIC BLOOD PRESSURE: 61 MMHG | BODY MASS INDEX: 33.69 KG/M2

## 2022-09-18 DIAGNOSIS — N61.0 MASTITIS: Primary | ICD-10-CM

## 2022-09-18 PROCEDURE — 99213 OFFICE O/P EST LOW 20 MIN: CPT | Performed by: PHYSICIAN ASSISTANT

## 2022-09-18 RX ORDER — DICLOXACILLIN SODIUM 500 MG
500 CAPSULE ORAL 4 TIMES DAILY
Qty: 40 CAPSULE | Refills: 0 | Status: SHIPPED | OUTPATIENT
Start: 2022-09-18 | End: 2022-09-28

## 2022-09-18 ASSESSMENT — ENCOUNTER SYMPTOMS
CHILLS: 0
COLOR CHANGE: 1
FEVER: 0

## 2022-09-18 NOTE — PATIENT INSTRUCTIONS
1. Take antibiotic as prescribed. Take this medication with food to avoid stomach upset.   2. Ibuprofen or Tylenol as needed for fever or pain. Apply cool compresses may also be helpful for pain control.   3. Continue to breast feed and/or pump. Completely empty the breast each time via feeding, pumping, or hand expression.  4. Continue to monitor your breast tissue. Follow up closely if you develop any new or worsening symptoms.   5. If you have further lactation question you can call the outpatient lactation line at [phone]. I believe they work during normal business hours.

## 2022-09-18 NOTE — PROGRESS NOTES
Patient presents with:  lump on breast: Possible mastitis- on the right breast- has some shooting pain      Clinical Decision Making: History and physical exam is strongly consistent with bacterial lactation related mastitis.  Patient started on dicloxacillin today.  No complicating factors such as abscess or systemic symptoms at this time.  Patient was educated to follow-up if symptoms developed.      ICD-10-CM    1. Mastitis  N61.0 dicloxacillin (DYNAPEN) 500 MG capsule       Patient Instructions   1. Take antibiotic as prescribed. Take this medication with food to avoid stomach upset.   2. Ibuprofen or Tylenol as needed for fever or pain. Apply cool compresses may also be helpful for pain control.   3. Continue to breast feed and/or pump. Completely empty the breast each time via feeding, pumping, or hand expression.  4. Continue to monitor your breast tissue. Follow up closely if you develop any new or worsening symptoms.   5. If you have further lactation question you can call the outpatient lactation line at [phone]. I believe they work during normal business hours.         HPI:  Cary Frey is a 22 year old female who is currently breast feeding who presents today complaining of painful right breast lump x 2-3 days. Patient has hx of mastitis that turned into an abscess. She denies sick sxs such as fever chills, she had those last time. She does report redness over the painful area just to the lateral surface of the right nipple.     History obtained from the patient.    Problem List:  2022-05: Iron deficiency anemia  2022-02: Pregnancy related renal disease, first trimester  2022-02: Obesity with body mass index 30 or greater  2022-02: Autosomal dominant polycystic kidney disease  2022-02: Tachycardia  2022-02: Encounter for triage in pregnant patient  2022-02: Normal labor  2021-04: Dyslexia  2021-04: Depression  2021-04: Anxiety  2012-12: Other chronic allergic conjunctivitis  2012-12:  Allergic rhinitis due to pollen  2012-12: Allergic rhinitis due to other allergen      Past Medical History:   Diagnosis Date     Depressive disorder     Tried taking medications 2018, unsure of date     NO ACTIVE PROBLEMS        Social History     Tobacco Use     Smoking status: Never Smoker     Smokeless tobacco: Former User   Substance Use Topics     Alcohol use: Not Currently       Review of Systems   Constitutional: Negative for chills and fever.   Skin: Positive for color change.       Vitals:    09/18/22 0912   BP: 98/61   BP Location: Right arm   Patient Position: Sitting   Cuff Size: Adult Regular   Pulse: 67   Resp: 18   Temp: 98.6  F (37  C)   TempSrc: Oral   SpO2: 97%   Weight: 78.2 kg (172 lb 8 oz)       Physical Exam  Vitals and nursing note reviewed.   Constitutional:       General: She is not in acute distress.     Appearance: She is not toxic-appearing or diaphoretic.   HENT:      Head: Normocephalic and atraumatic.      Right Ear: External ear normal.      Left Ear: External ear normal.   Eyes:      Conjunctiva/sclera: Conjunctivae normal.   Pulmonary:      Effort: Pulmonary effort is normal. No respiratory distress.   Chest:       Neurological:      Mental Status: She is alert.   Psychiatric:         Mood and Affect: Mood normal.         Behavior: Behavior normal.         Thought Content: Thought content normal.         Judgment: Judgment normal.       At the end of the encounter, I discussed results, diagnosis, medications. Discussed red flags for immediate return to clinic/ER, as well as indications for follow up if no improvement. Patient understood and agreed to plan. Patient was stable for discharge.

## 2022-10-20 ENCOUNTER — NURSE TRIAGE (OUTPATIENT)
Dept: NURSING | Facility: CLINIC | Age: 22
End: 2022-10-20

## 2022-10-20 NOTE — TELEPHONE ENCOUNTER
Patient states she has mastitis again.    She states this is the 4th time with this.    She states her symptoms started today.    Her right breast is painful, redness, and she has a lump around her nipple again.  Pain is a 4 out of 10.  No fever.    Per protocol patient advised to be seen in urgent care. Patient agrees with the plan. Patient also advised to make an appointment with a primary provider due to the number of times she has had this. Patient agrees with the plan.  Zayra Salazar RN on 10/20/2022 at 5:34 PM      Reason for Disposition    [1] Breast looks infected (spreading redness, feels hot or painful to touch) AND [2] no fever    Additional Information    Negative: Chest pain    Negative: Breastfeeding questions about baby    Negative: Breastfeeding questions about mother (breast symptoms or feeling sick)    Negative: Breastfeeding questions about mother's medicines and diet    Negative: Postpartum breast pain and swelling, not breastfeeding    Negative: Small spot, skin growth or mole    Negative: [1] SEVERE breast pain AND [2] fever > 103 F (39.4 C)    Negative: Patient sounds very sick or weak to the triager    Negative: [1] Breast looks infected (spreading redness, feels hot or painful to touch) AND [2] fever    Protocols used: BREAST SYMPTOMS-A-AH

## 2022-11-21 ENCOUNTER — NURSE TRIAGE (OUTPATIENT)
Dept: NURSING | Facility: CLINIC | Age: 22
End: 2022-11-21

## 2022-11-21 NOTE — TELEPHONE ENCOUNTER
"Patient calling with \"chest heaviness.\"    Yesterday started noticing a \"heavy\" feeling in center of her chest. Says he daughter has pneumonia and wondering if she is catching it, too.    Feels the heaviness more so when she takes a deep breath and is outside in the cold, but can feel it all the time, too. Denies any fever. Does have some SOB with exertion and a mild dry cough. Also answered yes to travel in a car for 6 or more hours within the last month (went to [place]) over [facility].    Protocol recommends pt go to ED now. Discussed ED versus [facility] room and pt will go to [facility]rgency room in [place]. Care advice and call back information discussed. Pt verbalized understanding.    Renee Farris, RN, BSN  [facility]   Triage Nurse Advisor        Reason for Disposition    History of prior 'blood clot' in leg or lungs (i.e., deep vein thrombosis, pulmonary embolism)    Additional Information    Negative: SEVERE difficulty breathing (e.g., struggling for each breath, speaks in single words)    Negative: Passed out (i.e., fainted, collapsed and was not responding)    Negative: Difficult to awaken or acting confused (e.g., disoriented, slurred speech)    Negative: Shock suspected (e.g., cold/pale/clammy skin, too weak to stand, low BP, rapid pulse)    Negative: Chest pain lasting longer than 5 minutes and ANY of the following:* Over 44 years old* Over 30 years old and at least one cardiac risk factor (e.g., diabetes mellitus, high blood pressure, high cholesterol, smoker, or strong family history of heart disease)* History of heart disease (i.e., angina, heart attack, heart failure, bypass surgery, takes nitroglycerin)* Pain is crushing, pressure-like, or heavy    Negative: Heart beating < 50 beats per minute OR > 140 beats per minute    Negative: Visible sweat on face or sweat dripping down face    Negative: Sounds like a life-threatening emergency to the triager    Negative: Followed an injury to chest    " Negative: SEVERE chest pain    Negative: Pain also in shoulder(s) or arm(s) or jaw    Negative: Difficulty breathing    Negative: Cocaine use within last 3 days    Negative: Major surgery in the past month    Negative: Hip or leg fracture (broken bone) in past month (or had cast on leg or ankle in past month)    Negative: Illness requiring prolonged bedrest in past month (e.g., immobilization, long hospital stay)    Negative: Long-distance travel in past month (e.g., car, bus, train, plane; with trip lasting 6 or more hours)    Protocols used: CHEST PAIN-A-[place]

## 2022-12-26 ENCOUNTER — OFFICE VISIT (OUTPATIENT)
Dept: FAMILY MEDICINE | Facility: CLINIC | Age: 22
End: 2022-12-26
Payer: COMMERCIAL

## 2022-12-26 VITALS
RESPIRATION RATE: 16 BRPM | TEMPERATURE: 98.7 F | HEIGHT: 61 IN | OXYGEN SATURATION: 97 % | WEIGHT: 168.6 LBS | DIASTOLIC BLOOD PRESSURE: 61 MMHG | BODY MASS INDEX: 31.83 KG/M2 | HEART RATE: 73 BPM | SYSTOLIC BLOOD PRESSURE: 94 MMHG

## 2022-12-26 DIAGNOSIS — Z00.00 ENCOUNTER FOR MEDICAL EXAMINATION TO ESTABLISH CARE: Primary | ICD-10-CM

## 2022-12-26 DIAGNOSIS — E66.9 OBESITY WITH BODY MASS INDEX 30 OR GREATER: ICD-10-CM

## 2022-12-26 DIAGNOSIS — M62.838 MUSCLE SPASM: ICD-10-CM

## 2022-12-26 DIAGNOSIS — Q61.2 AUTOSOMAL DOMINANT POLYCYSTIC KIDNEY DISEASE: ICD-10-CM

## 2022-12-26 DIAGNOSIS — R21 RASH: ICD-10-CM

## 2022-12-26 PROBLEM — O26.831: Status: RESOLVED | Noted: 2022-02-25 | Resolved: 2022-12-26

## 2022-12-26 PROBLEM — Z37.9 NORMAL LABOR: Status: RESOLVED | Noted: 2022-02-11 | Resolved: 2022-12-26

## 2022-12-26 PROBLEM — D50.9 IRON DEFICIENCY ANEMIA: Status: RESOLVED | Noted: 2022-05-03 | Resolved: 2022-12-26

## 2022-12-26 PROBLEM — F32.A DEPRESSION: Status: RESOLVED | Noted: 2021-04-28 | Resolved: 2022-12-26

## 2022-12-26 PROBLEM — R00.0 TACHYCARDIA: Status: RESOLVED | Noted: 2022-02-12 | Resolved: 2022-12-26

## 2022-12-26 PROBLEM — F41.9 ANXIETY: Status: RESOLVED | Noted: 2021-04-28 | Resolved: 2022-12-26

## 2022-12-26 PROBLEM — Z36.89 ENCOUNTER FOR TRIAGE IN PREGNANT PATIENT: Status: RESOLVED | Noted: 2022-02-11 | Resolved: 2022-12-26

## 2022-12-26 PROCEDURE — 99214 OFFICE O/P EST MOD 30 MIN: CPT | Performed by: FAMILY MEDICINE

## 2022-12-26 RX ORDER — MUPIROCIN 20 MG/G
OINTMENT TOPICAL 3 TIMES DAILY
Qty: 30 G | Refills: 0 | Status: SHIPPED | OUTPATIENT
Start: 2022-12-26

## 2022-12-26 RX ORDER — TRIAMCINOLONE ACETONIDE 1 MG/G
CREAM TOPICAL 2 TIMES DAILY
Qty: 30 G | Refills: 0 | Status: SHIPPED | OUTPATIENT
Start: 2022-12-26

## 2022-12-26 NOTE — PROGRESS NOTES
Assessment & Plan     1. Encounter for medical examination to establish care  Reviewed health history and health maintenance recommendations.  Reconciled chart.    2. Rash  - mupirocin (BACTROBAN) 2 % external ointment; Apply topically 3 times daily  Dispense: 30 g; Refill: 0  - triamcinolone (KENALOG) 0.1 % external cream; Apply topically 2 times daily  Dispense: 30 g; Refill: 0    Rash right lower abdomen, differential includes herpes versus shingles versus mild dermatitis with secondary infection.  We will treat with topical antibiotic and topical steroid to cover for infection and dermatitis.  If symptoms are evolving or worsening, requested she send me an update and we can cover with Valtrex.  Not yet vesicular, but slightly itchy so suspicious for potential early viral rash.    3. Autosomal dominant polycystic kidney disease  - US Renal Complete Non-Vascular; Future    Incidental finding on genetic screening in pregnancy.  Patient is adopted and does not know her family history.  GFR is normal and blood pressure is normal.  I do not see any imaging of her abdomen for further evaluation of renal structure.  We will proceed with bilateral ultrasounds to better evaluate baseline anatomical structure of kidneys.    4. Obesity with body mass index 30 or greater  10 months postpartum, recently discontinued breast-feeding.  Encouraged continuing to work on a heart healthy diet, regular physical activity, and weight management.    5. Muscle spasm  Intermittent neck pain has seen chiropractor for this in the past which has been helpful.  Continue Tylenol and ibuprofen as needed, preferentially Tylenol given kidney concerns above. May continue seeing chiropractor as needed.  Can refer to physical therapy if worsening, or trial muscle relaxer PRN.    Return in about 6 months (around 6/26/2023) for Physical Exam.    Marie Matthews, [facility]    32 minutes spent on date of encounter  "with chart review, patient visit, counseling, and documentation.    Irais Funk is a 22 year old presenting for the following health issues:  Establish Care      History of Present Illness       Reason for visit:  To establish primary care    She eats 0-1 servings of fruits and vegetables daily.She consumes 1 sweetened beverage(s) daily.She exercises with enough effort to increase her heart rate 9 or less minutes per day.  She exercises with enough effort to increase her heart rate 3 or less days per week. She is missing 2 dose(s) of medications per week.     Has been to a chiropractor in the past due to head tilt. Has been better.     Having intermittent headaches lately, not too bad.     Noticed a rash on her side recently.     PHQ 5/6/2022   PHQ-9 Total Score 0   Q9: Thoughts of better off dead/self-harm past 2 weeks Not at all     LAURA-7 SCORE 5/6/2022   Total Score 0 (minimal anxiety)   Total Score 0     Mood has been well controlled off lexapro, would like to discontinue.     Carrier of genetic screening of autosomal dominant PCKD. Adopted, family history unknown.     HEALTH MAINTENANCE:   - Hep C: declines   - STI: has been screened    - TDAP: had in pregnancy, will update records    - Flu: declines   - Covid: declines      Review of Systems   See HPI above.       Objective    BP 94/61 (BP Location: Left arm, Patient Position: Sitting, Cuff Size: Adult Large)   Pulse 73   Temp 98.7  F (37.1  C) (Oral)   Resp 16   Ht 1.556 m (5' 1.25\")   Wt 76.5 kg (168 lb 9.6 oz)   LMP 11/09/2022 (Approximate)   SpO2 97%   Breastfeeding Yes   BMI 31.60 kg/m    Body mass index is 46.94 kg/m .  Physical Exam   GENERAL: healthy, alert and no distress  NECK: no adenopathy, no asymmetry, masses, or scars and thyroid normal to palpation  RESP: lungs clear to auscultation - no rales, rhonchi or wheezes  CV: regular rate and rhythm, normal S1 S2, no S3 or S4, no murmur, click or rub, no peripheral edema and peripheral " pulses strong  ABDOMEN: soft, nontender, no hepatosplenomegaly, no masses and bowel sounds normal  MS: no gross musculoskeletal defects noted, no edema

## 2022-12-28 ENCOUNTER — HOSPITAL ENCOUNTER (OUTPATIENT)
Dept: ULTRASOUND IMAGING | Facility: HOSPITAL | Age: 22
Discharge: HOME | End: 2022-12-28
Attending: FAMILY MEDICINE | Admitting: FAMILY MEDICINE
Payer: COMMERCIAL

## 2022-12-28 DIAGNOSIS — Q61.2 AUTOSOMAL DOMINANT POLYCYSTIC KIDNEY DISEASE: ICD-10-CM

## 2022-12-28 PROCEDURE — 76770 US EXAM ABDO BACK WALL COMP: CPT

## 2022-12-28 NOTE — RESULT ENCOUNTER NOTE
Please call patient and let her know her ultrasound was reassuring today.  No sign of polycystic kidney disease at this time.  Marie Matthews, DO

## 2022-12-29 ENCOUNTER — TELEPHONE (OUTPATIENT)
Dept: FAMILY MEDICINE | Facility: CLINIC | Age: 22
End: 2022-12-29

## 2022-12-29 NOTE — TELEPHONE ENCOUNTER
----- Message from Marie Matthews DO sent at 12/28/2022  1:42 PM CST -----  Please call patient and let her know her ultrasound was reassuring today.  No sign of polycystic kidney disease at this time.  Marie Matthews DO